# Patient Record
Sex: MALE | Race: AMERICAN INDIAN OR ALASKA NATIVE | Employment: FULL TIME | URBAN - METROPOLITAN AREA
[De-identification: names, ages, dates, MRNs, and addresses within clinical notes are randomized per-mention and may not be internally consistent; named-entity substitution may affect disease eponyms.]

---

## 2017-06-29 ENCOUNTER — OFFICE VISIT (OUTPATIENT)
Dept: FAMILY MEDICINE CLINIC | Age: 42
End: 2017-06-29

## 2017-06-29 VITALS
WEIGHT: 169 LBS | HEART RATE: 90 BPM | OXYGEN SATURATION: 98 % | TEMPERATURE: 98.1 F | BODY MASS INDEX: 25.61 KG/M2 | HEIGHT: 68 IN | RESPIRATION RATE: 17 BRPM | SYSTOLIC BLOOD PRESSURE: 128 MMHG | DIASTOLIC BLOOD PRESSURE: 82 MMHG

## 2017-06-29 DIAGNOSIS — Z00.00 GENERAL MEDICAL EXAM: Primary | ICD-10-CM

## 2017-06-29 NOTE — PATIENT INSTRUCTIONS

## 2017-06-29 NOTE — MR AVS SNAPSHOT
Visit Information Date & Time Provider Department Dept. Phone Encounter #  
 6/29/2017 10:15 AM [phone] [id number] Follow-up Instructions Return in about 1 year (around 6/29/2018). Upcoming Health Maintenance Date Due DTaP/Tdap/Td series (1 - Tdap) 9/13/1994 INFLUENZA AGE 9 TO ADULT 8/1/2017 Allergies as of 6/29/2017  Review Complete On: 6/29/2017 By: Carly Duong MD  
 No Known Allergies Current Immunizations  Never Reviewed Name Date Tdap 6/29/2016 Not reviewed this visit You Were Diagnosed With   
  
 Codes Comments General medical exam    -  Primary ICD-10-CM: Z00.00 ICD-9-CM: V70.9 Vitals BP Pulse Temp Resp Height(growth percentile) Weight(growth percentile) 128/82 (BP 1 Location: Left arm, BP Patient Position: Sitting) 90 98.1 °F (36.7 °C) (Oral) 17 5' 8\" (1.727 m) 169 lb (76.7 kg) SpO2 BMI Smoking Status 98% 25.7 kg/m2 Former Smoker BMI and BSA Data Body Mass Index Body Surface Area 25.7 kg/m 2 1.92 m 2 Preferred Pharmacy Pharmacy Name Phone [facility] [address] [phone] Your Updated Medication List  
  
Notice  As of 6/29/2017 11:47 AM  
 You have not been prescribed any medications. We Performed the Following CBC WITH AUTOMATED DIFF [45311 CPT(R)] LIPID PANEL [15867 CPT(R)] METABOLIC PANEL, COMPREHENSIVE [84270 CPT(R)] Follow-up Instructions Return in about 1 year (around 6/29/2018). Patient Instructions Well Visit, Ages 25 to 48: Care Instructions Your Care Instructions Physical exams can help you stay healthy. Your doctor has checked your overall health and may have suggested ways to take good care of yourself. He or she also may have recommended tests.  At home, you can help prevent illness with healthy eating, regular exercise, and other steps. Follow-up care is a key part of your treatment and safety. Be sure to make and go to all appointments, and call your doctor if you are having problems. It's also a good idea to know your test results and keep a list of the medicines you take. How can you care for yourself at home? · Reach and stay at a healthy weight. This will lower your risk for many problems, such as obesity, diabetes, heart disease, and high blood pressure. · Get at least 30 minutes of physical activity on most days of the week. Walking is a good choice. You also may want to do other activities, such as running, swimming, cycling, or playing tennis or team sports. Discuss any changes in your exercise program with your doctor. · Do not smoke or allow others to smoke around you. If you need help quitting, talk to your doctor about stop-smoking programs and medicines. These can increase your chances of quitting for good. · Talk to your doctor about whether you have any risk factors for sexually transmitted infections (STIs). Having one sex partner (who does not have STIs and does not have sex with anyone else) is a good way to avoid these infections. · Use birth control if you do not want to have children at this time. Talk with your doctor about the choices available and what might be best for you. · Protect your skin from too much sun. When you're outdoors from 10 a.m. to 4 p.m., stay in the shade or cover up with clothing and a hat with a wide brim. Wear sunglasses that block UV rays. Even when it's cloudy, put broad-spectrum sunscreen (SPF 30 or higher) on any exposed skin. · See a dentist one or two times a year for checkups and to have your teeth cleaned. · Wear a seat belt in the car. · Drink alcohol in moderation, if at all. That means no more than 2 drinks a day for men and 1 drink a day for women. Follow your doctor's advice about when to have certain tests. These tests can spot problems early. For everyone · Cholesterol. Have the fat (cholesterol) in your blood tested after age 21. Your doctor will tell you how often to have this done based on your age, family history, or other things that can increase your risk for heart disease. · Blood pressure. Have your blood pressure checked during a routine doctor visit. Your doctor will tell you how often to check your blood pressure based on your age, your blood pressure results, and other factors. · Vision. Talk with your doctor about how often to have a glaucoma test. 
· Diabetes. Ask your doctor whether you should have tests for diabetes. · Colon cancer. Have a test for colon cancer at age 48. You may have one of several tests. If you are younger than 48, you may need a test earlier if you have any risk factors. Risk factors include whether you already had a precancerous polyp removed from your colon or whether your parent, brother, sister, or child has had colon cancer. For women · Breast exam and mammogram. Talk to your doctor about when you should have a clinical breast exam and a mammogram. Medical experts differ on whether and how often women under 50 should have these tests. Your doctor can help you decide what is right for you. · Pap test and pelvic exam. Begin Pap tests at age 24. A Pap test is the best way to find cervical cancer. The test often is part of a pelvic exam. Ask how often to have this test. 
· Tests for sexually transmitted infections (STIs). Ask whether you should have tests for STIs. You may be at risk if you have sex with more than one person, especially if your partners do not wear condoms. For men · Tests for sexually transmitted infections (STIs). Ask whether you should have tests for STIs. You may be at risk if you have sex with more than one person, especially if you do not wear a condom. · Testicular cancer exam. Ask your doctor whether you should check your testicles regularly. · Prostate exam. Talk to your doctor about whether you should have a blood test (called a PSA test) for prostate cancer. Experts differ on whether and when men should have this test. Some experts suggest it if you are older than 39 and are -American or have a father or brother who got prostate cancer when he was younger than 72. When should you call for help? Watch closely for changes in your health, and be sure to contact your doctor if you have any problems or symptoms that concern you. Where can you learn more? Go to http://jc-fuad.info/. Enter P072 in the search box to learn more about \"Well Visit, Ages 25 to 48: Care Instructions. \" Current as of: July 19, 2016 Content Version: 11.3 © 8323-0931 [employer]. Care instructions adapted under license by [employer] (which disclaims liability or warranty for this information). If you have questions about a medical condition or this instruction, always ask your healthcare professional. [place] [address] any warranty or liability for your use of this information. Introducing [facility]! Herbert Bliss introduces [email or web address] patient portal. Now you can access parts of your medical record, email your doctor's office, and request medication refills online. 1. In your internet browser, go to [email or web address] 2. Click on the First Time User? Click Here link in the Sign In box. You will see the New Member Sign Up page. 3. Enter your [employer] Access Code exactly as it appears below. You will not need to use this code after youve completed the sign-up process. If you do not sign up before the expiration date, you must request a new code. · [email or web address] Access Code: [email or web address] Expires: 9/27/2017 10:31 AM 
 
 4. Enter the last four digits of your Social Security Number (xxxx) and Date of Birth (mm/dd/yyyy) as indicated and click Submit. You will be taken to the next sign-up page. 5. Create a [employer] ID. This will be your [employer] login ID and cannot be changed, so think of one that is secure and easy to remember. 6. Create a [employer] password. You can change your password at any time. 7. Enter your Password Reset Question and Answer. This can be used at a later time if you forget your password. 8. Enter your e-mail address. You will receive e-mail notification when new information is available in [address] 9. Click Sign Up. You can now view and download portions of your medical record. 10. Click the Download Summary menu link to download a portable copy of your medical information. If you have questions, please visit the Frequently Asked Questions section of the [employer] website. Remember, [employer] is NOT to be used for urgent needs. For medical emergencies, dial 911. Now available from your iPhone and Android! Please provide this summary of care documentation to your next provider. Your primary care clinician is listed as Terrell Barajas. If you have any questions after today's visit, please call [phone].

## 2017-06-29 NOTE — PROGRESS NOTES
Chief Complaint   Patient presents with   [facility]     Health Maintenance reviewed

## 2017-06-29 NOTE — PROGRESS NOTES
35 y.o. male presents for a physical.     History reviewed. No pertinent past medical history. Past Surgical History:   Procedure Laterality Date    HX ORTHOPAEDIC      scoliosis surgery       Family History   Problem Relation Age of Onset    Diabetes Mother     Heart Disease Mother     Diabetes Father        Social History   Substance Use Topics    Smoking status: Former Smoker     Quit date: 5/2/1996    Smokeless tobacco: Current User     Types: Snuff    Alcohol use 4.2 oz/week     7 Standard drinks or equivalent per week       Social History     Social History Narrative    . .        Health Maintenance Due   Topic Date Due    DTaP/Tdap/Td series (1 - Tdap) 09/13/1994         No Known Allergies    Review of Systems:  Gen: no fatigue, fever, chills, weight loss or weight gain  Eyes: no excessive tearing, itching, or discharge  Nose: no rhinorrhea, no sinus pain  Mouth: no oral lesions, no sore throat  Resp: no shortness of breath, no wheezing, no cough  CV: no chest pain, no orthopnea, no paroxysmal nocturnal dyspnea, no lower extremity edema, no palpitations  Abd: no nausea, no heartburn, no diarrhea, no constipation, no abdominal pain  Neuro: no headaches, no syncope or presyncopal episodes  Endo: no polyuria, no polydipsia  Heme: no lymphadenopathy, no easy bruising or bleeding    Visit Vitals    /82 (BP 1 Location: Left arm, BP Patient Position: Sitting)    Pulse 90    Temp 98.1 °F (36.7 °C) (Oral)    Resp 17    Ht 5' 8\" (1.727 m)    Wt 169 lb (76.7 kg)    SpO2 98%    BMI 25.7 kg/m2     Gen: alert, oriented, no acute distress  Ears: external auditory canals clear, TMs without erythema or effusion  Eyes: pupils equal round reactive to light, sclera clear, conjunctiva clear  Oral: moist mucus membranes, no oral lesions, no pharyngeal inflammation or exudate  Neck: thyroid symmetric and not enlarged, no carotid bruits, no jugular vein distention  Resp: no increase work of breathing, lungs clear to ausculation bilaterally, no wheezing, rales or rhonchi  CV: S1, S2 normal. No murmurs, rubs, or gallops. Abd: soft, not tender, not distended. No hepatosplenomegaly. Normal bowel sounds. No hernias. Neuro: cranial nerves intact, normal strength and movement in all extremities, reflexes and sensation intact and symmetric. Skin: no lesion or rash  Extremities: no cyanosis or edema    Assessment/Plan:    1. General medical exam  Age appropriate Health Maintenance activities reviewed with patient and updated. - CBC WITH AUTOMATED DIFF  - METABOLIC PANEL, COMPREHENSIVE  - LIPID PANEL      Health Maintenance reviewed - updated    Orders Placed This Encounter    CBC WITH AUTOMATED DIFF    METABOLIC PANEL, COMPREHENSIVE    LIPID PANEL       Medications Discontinued During This Encounter   Medication Reason    methylPREDNISolone (MEDROL DOSEPACK) 4 mg tablet Therapy Completed    phenylephrine-chlorpheniramine-hydrocodone (HISTINEX HC) 2-5-2.5 mg/5 mL Syrp Therapy Completed    phenylephrine-chlorpheniramine-hydrocodone (HISTINEX HC) 2-5-2.5 mg/5 mL Syrp Therapy Completed    predniSONE (STERAPRED DS) 10 mg dose pack Therapy Completed       Recommended healthy diet low in carbohydrates, fats, sodium and cholesterol. Recommended regular cardiovascular exercise 3-6 times per week for 30-60 minutes daily. Verbal and written instructions (see AVS) provided. Patient expresses understanding of diagnosis and treatment plan.

## 2017-06-30 LAB
ALBUMIN SERPL-MCNC: 4.6 G/DL (ref 3.5–5.5)
ALBUMIN/GLOB SERPL: 1.9 {RATIO} (ref 1.2–2.2)
ALP SERPL-CCNC: 76 IU/L (ref 39–117)
ALT SERPL-CCNC: 21 IU/L (ref 0–44)
AST SERPL-CCNC: 26 IU/L (ref 0–40)
BASOPHILS # BLD AUTO: 0.1 X10E3/UL (ref 0–0.2)
BASOPHILS NFR BLD AUTO: 1 %
BILIRUB SERPL-MCNC: 0.3 MG/DL (ref 0–1.2)
BUN SERPL-MCNC: 14 MG/DL (ref 6–24)
BUN/CREAT SERPL: 15 (ref 9–20)
CALCIUM SERPL-MCNC: 10.5 MG/DL (ref 8.7–10.2)
CHLORIDE SERPL-SCNC: 98 MMOL/L (ref 96–106)
CHOLEST SERPL-MCNC: 204 MG/DL (ref 100–199)
CO2 SERPL-SCNC: 27 MMOL/L (ref 18–29)
CREAT SERPL-MCNC: 0.94 MG/DL (ref 0.76–1.27)
EOSINOPHIL # BLD AUTO: 0.1 X10E3/UL (ref 0–0.4)
EOSINOPHIL NFR BLD AUTO: 3 %
ERYTHROCYTE [DISTWIDTH] IN BLOOD BY AUTOMATED COUNT: 13.9 % (ref 12.3–15.4)
GLOBULIN SER CALC-MCNC: 2.4 G/DL (ref 1.5–4.5)
GLUCOSE SERPL-MCNC: 95 MG/DL (ref 65–99)
HCT VFR BLD AUTO: 45.4 % (ref 37.5–51)
HDLC SERPL-MCNC: 83 MG/DL
HGB BLD-MCNC: 15 G/DL (ref 12.6–17.7)
IMM GRANULOCYTES # BLD: 0 X10E3/UL (ref 0–0.1)
IMM GRANULOCYTES NFR BLD: 0 %
INTERPRETATION, 910389: NORMAL
LDLC SERPL CALC-MCNC: 107 MG/DL (ref 0–99)
LYMPHOCYTES # BLD AUTO: 1.6 X10E3/UL (ref 0.7–3.1)
LYMPHOCYTES NFR BLD AUTO: 35 %
MCH RBC QN AUTO: 28.7 PG (ref 26.6–33)
MCHC RBC AUTO-ENTMCNC: 33 G/DL (ref 31.5–35.7)
MCV RBC AUTO: 87 FL (ref 79–97)
MONOCYTES # BLD AUTO: 0.5 X10E3/UL (ref 0.1–0.9)
MONOCYTES NFR BLD AUTO: 10 %
NEUTROPHILS # BLD AUTO: 2.5 X10E3/UL (ref 1.4–7)
NEUTROPHILS NFR BLD AUTO: 51 %
PLATELET # BLD AUTO: 231 X10E3/UL (ref 150–379)
POTASSIUM SERPL-SCNC: 4 MMOL/L (ref 3.5–5.2)
PROT SERPL-MCNC: 7 G/DL (ref 6–8.5)
RBC # BLD AUTO: 5.22 X10E6/UL (ref 4.14–5.8)
SODIUM SERPL-SCNC: 141 MMOL/L (ref 134–144)
TRIGL SERPL-MCNC: 71 MG/DL (ref 0–149)
VLDLC SERPL CALC-MCNC: 14 MG/DL (ref 5–40)
WBC # BLD AUTO: 4.8 X10E3/UL (ref 3.4–10.8)

## 2017-07-05 ENCOUNTER — OFFICE VISIT (OUTPATIENT)
Dept: FAMILY MEDICINE CLINIC | Age: 42
End: 2017-07-05

## 2017-07-05 VITALS
WEIGHT: 169 LBS | RESPIRATION RATE: 14 BRPM | SYSTOLIC BLOOD PRESSURE: 123 MMHG | DIASTOLIC BLOOD PRESSURE: 90 MMHG | HEIGHT: 68 IN | HEART RATE: 86 BPM | BODY MASS INDEX: 25.61 KG/M2 | TEMPERATURE: 98 F | OXYGEN SATURATION: 96 %

## 2017-07-05 DIAGNOSIS — L23.7 POISON IVY DERMATITIS: ICD-10-CM

## 2017-07-05 RX ORDER — TRIAMCINOLONE ACETONIDE 1 MG/G
CREAM TOPICAL 2 TIMES DAILY
Qty: 80 G | Refills: 0 | Status: SHIPPED | OUTPATIENT
Start: 2017-07-05 | End: 2019-04-15

## 2017-07-05 RX ORDER — PREDNISONE 10 MG/1
TABLET ORAL
Qty: 21 TAB | Refills: 0 | Status: SHIPPED | OUTPATIENT
Start: 2017-07-05 | End: 2019-01-31 | Stop reason: ALTCHOICE

## 2017-07-05 RX ORDER — TRIAMCINOLONE ACETONIDE 1 MG/G
CREAM TOPICAL 2 TIMES DAILY
Qty: 80 G | Refills: 0 | Status: SHIPPED | OUTPATIENT
Start: 2017-07-05 | End: 2017-07-05 | Stop reason: SDUPTHER

## 2017-07-05 RX ORDER — TRIAMCINOLONE ACETONIDE 40 MG/ML
20 INJECTION, SUSPENSION INTRA-ARTICULAR; INTRAMUSCULAR ONCE
Qty: 0.5 ML | Refills: 0
Start: 2017-07-05 | End: 2017-07-05

## 2017-07-05 RX ORDER — PREDNISONE 10 MG/1
TABLET ORAL
Qty: 21 TAB | Refills: 0 | Status: SHIPPED | OUTPATIENT
Start: 2017-07-05 | End: 2017-07-05 | Stop reason: SDUPTHER

## 2017-07-05 NOTE — PROGRESS NOTES
After obtaining consent, and per orders of Dr. Luisa Cavazos, injection of Kenalog 40 mg given by Lisbet Osborne. Patient instructed to remain in clinic for 20 minutes afterwards, and to report any adverse reaction to me immediately.

## 2017-07-05 NOTE — PROGRESS NOTES
HPI  Jorden Mobley. is a 35 y.o. male who presents with a rash, was picking by. She had a day and a day later developed his typical poison ivy rash. The next day similar rash sprung up on his right cheek and left buttock. He has a history of bad allergic reactions to poison ivy. He is received Kenalog injection and steroid Dosepak in the past which he finds very effective. He is requesting the same again. PMHx:  No past medical history on file. Meds:   Current Outpatient Prescriptions   Medication Sig Dispense Refill    predniSONE (STERAPRED DS) 10 mg dose pack See administration instruction per 10mg dose pack 21 Tab 0    triamcinolone acetonide (KENALOG) 0.1 % topical cream Apply  to affected area two (2) times a day. use thin layer 80 g 0       Allergies:   No Known Allergies    Smoker:  History   Smoking Status    Former Smoker    Quit date: 5/2/1996   Smokeless Tobacco    Current User    Types: Snuff       ETOH:   History   Alcohol Use    4.2 oz/week    7 Standard drinks or equivalent per week       FH:   Family History   Problem Relation Age of Onset    Diabetes Mother     Heart Disease Mother     Diabetes Father        ROS:   As listed in HPI. In addition:  Constitutional:   No headache, fever, fatigue, weight loss or weight gain      Cardiac:    No chest pain      Resp:   No cough or shortness of breath      Neuro   No loss of consciousness, dizziness, seizures      Physical Exam:  Blood pressure 123/90, pulse 86, temperature 98 °F (36.7 °C), resp. rate 14, height 5' 8\" (1.727 m), weight 169 lb (76.7 kg), SpO2 96 %. GEN: No apparent distress. Alert and oriented and responds to all questions appropriately. NEUROLOGIC:  No focal neurologic deficits. Strength and sensation grossly intact. Coordination and gait grossly intact. EXT: Well perfused. No edema. SKIN: Contact dermatitis of the left wrist about 2 inches square, right cheek in a few spots.        Assessment and Plan Poison ivy dermatitis  Relatively minor, would be amenable to steroid cream.  Patient is concerned that it might get worse and it certainly may given the timeline. The rash on his face in particular is still evolving. Will do as he requests, Kenalog Injection, prednisone Dosepak (which she has had in the past) just in case, strongly recommend that he also use the topical steroids as rash may return after the systemic steroids wear off. ICD-10-CM ICD-9-CM    1. Poison ivy dermatitis L23.7 692.6 triamcinolone acetonide (KENALOG) 40 mg/mL injection      predniSONE (STERAPRED DS) 10 mg dose pack      triamcinolone acetonide (KENALOG) 0.1 % topical cream      TRIAMCINOLONE ACETONIDE INJ      DISCONTINUED: predniSONE (STERAPRED DS) 10 mg dose pack      DISCONTINUED: triamcinolone acetonide (KENALOG) 0.1 % topical cream       AVS given.  Pt expressed understanding of instructions

## 2019-01-31 ENCOUNTER — OFFICE VISIT (OUTPATIENT)
Dept: FAMILY MEDICINE CLINIC | Age: 44
End: 2019-01-31

## 2019-01-31 VITALS
OXYGEN SATURATION: 98 % | HEART RATE: 107 BPM | SYSTOLIC BLOOD PRESSURE: 141 MMHG | WEIGHT: 178 LBS | RESPIRATION RATE: 12 BRPM | DIASTOLIC BLOOD PRESSURE: 100 MMHG | BODY MASS INDEX: 26.98 KG/M2 | HEIGHT: 68 IN | TEMPERATURE: 98.3 F

## 2019-01-31 DIAGNOSIS — R03.0 ELEVATED BP WITHOUT DIAGNOSIS OF HYPERTENSION: ICD-10-CM

## 2019-01-31 DIAGNOSIS — Z00.00 GENERAL MEDICAL EXAM: ICD-10-CM

## 2019-01-31 DIAGNOSIS — K52.9 GASTROENTERITIS: Primary | ICD-10-CM

## 2019-01-31 DIAGNOSIS — F43.0 ACUTE STRESS REACTION: ICD-10-CM

## 2019-01-31 DIAGNOSIS — R11.2 NON-INTRACTABLE VOMITING WITH NAUSEA, UNSPECIFIED VOMITING TYPE: ICD-10-CM

## 2019-01-31 RX ORDER — ONDANSETRON 4 MG/1
4 TABLET, ORALLY DISINTEGRATING ORAL
Qty: 40 TAB | Refills: 0 | Status: SHIPPED | OUTPATIENT
Start: 2019-01-31 | End: 2019-06-12

## 2019-01-31 NOTE — PROGRESS NOTES
HPI  Fani Vick. is a 37y.o. year old male patient of Sivakumar Barger MD who presents with c/o stomach pain. Pt has history of does not have a problem list on file. .    Pt c/o vomiting since last Friday. Denies abd pain. Denies diarrhea or loose stools. Is tolerating PO. Had 2 episodes of vomiting yesterday. Has some mild heartburn, chronic not new, takes tums prn. Wife had stomach flu last week. Wife is a nurse. Has taken some of her nausea medication, not sure of name. Drinks 4-5 beers/day but not everyday of the week (not when he is working), averages around 15 drinks/week. Pt hasn't been seen in clinic since 2017. Declines flu vaccine. Doesn't check BP at home. Denies hx of tachycardia. Denies chest pain or SOB. Reports feeling down since witnessing the death of a child at work last week. Pt is a , was first one on scene to death of a young child. Denies hx of mental illness. Denies anhedonia. Denies SI/HI. Feels depressed some days of the week. Has a cousin that's a  that he can talk to. Has good support with his family. He is not interested in talking to a counselor at this time. 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 There is no problem list on file for this patient. No past medical history on file. Past Surgical History:   Procedure Laterality Date    HX ORTHOPAEDIC      scoliosis surgery     Social History     Socioeconomic History    Marital status:      Spouse name: Not on file    Number of children: Not on file    Years of education: Not on file    Highest education level: Not on file   Tobacco Use    Smoking status: Former Smoker     Last attempt to quit: 1996     Years since quittin.7    Smokeless tobacco: Current User     Types: Snuff   Substance and Sexual Activity    Alcohol use:  Yes     Alcohol/week: 4.2 oz     Types: 7 Standard drinks or equivalent per week    Drug use: No    Sexual activity: Yes Partners: Female   Social History Narrative    . . Family History   Problem Relation Age of Onset    Diabetes Mother     Heart Disease Mother     Hypertension Mother     Heart Attack Mother     Diabetes Father     Hypertension Father     Pancreatic Cancer Paternal Grandmother      No Known Allergies    MEDICATIONS  Current Outpatient Medications   Medication Sig    ondansetron (ZOFRAN ODT) 4 mg disintegrating tablet Take 1 Tab by mouth every eight (8) hours as needed for Nausea.  triamcinolone acetonide (KENALOG) 0.1 % topical cream Apply  to affected area two (2) times a day. use thin layer     No current facility-administered medications for this visit. REVIEW OF SYSTEMS  Per HPI        Visit Vitals  BP (!) 141/100 (BP 1 Location: Left arm, BP Patient Position: Sitting)   Pulse (!) 107   Temp 98.3 °F (36.8 °C) (Oral)   Resp 12   Ht 5' 8\" (1.727 m)   Wt 178 lb (80.7 kg)   SpO2 98%   BMI 27.06 kg/m²         General: Well-developed, well-nourished. In no distress. A&O x 3. Head: Normocephalic, atraumatic. Eyes: Conjunctiva clear. Lungs: Clear to auscultation bilaterally. No crackles or wheezes. No use of accessory muscles. Speaks in full sentences without SOB. Chest Wall: No tenderness or deformity. Heart: RRR, normal S1 and S2, no murmur, click, rub, or gallop. Back: Symmetric. Abdomen: Soft, non-distended, bowel sounds normal. No tenderness. No masses. No hepatosplenomegaly. .   Skin: No rashes or lesions. Musculoskeletal: Gait normal.  Psychiatric: Normal mood and affect. Behavior is normal.       ASSESSMENT and PLAN  Diagnoses and all orders for this visit:    1. Gastroenteritis  -     ondansetron (ZOFRAN ODT) 4 mg disintegrating tablet; Take 1 Tab by mouth every eight (8) hours as needed for Nausea. -contact office if sx worsening or not improving    2.  Non-intractable vomiting with nausea, unspecified vomiting type  -     LIPASE- r/o pancreatitis, low suspicion as pt not in abd pain     3. General medical exam  -     CBC WITH AUTOMATED DIFF  -     METABOLIC PANEL, COMPREHENSIVE  -     LIPID PANEL    4. Acute stress reaction  Discussed options to treat possible stress reaction vs PTSD including referral to counseling, starting an SSRI if sx worsen or occur daily, etc.   Discuss at f/u in 1 month     5. Elevated BP without diagnosis of hypertension  First elevated BP reading, may be due to acute illness  Will recheck in 1 month           Patient Instructions          Gastroenteritis: Care Instructions  Your Care Instructions    Gastroenteritis is an illness that may cause nausea, vomiting, and diarrhea. It is sometimes called \"stomach flu. \" It can be caused by bacteria or a virus. You will probably begin to feel better in 1 to 2 days. In the meantime, get plenty of rest and make sure you do not become dehydrated. Dehydration occurs when your body loses too much fluid. Follow-up care is a key part of your treatment and safety. Be sure to make and go to all appointments, and call your doctor if you are having problems. It's also a good idea to know your test results and keep a list of the medicines you take. How can you care for yourself at home? · If your doctor prescribed antibiotics, take them as directed. Do not stop taking them just because you feel better. You need to take the full course of antibiotics. · Drink plenty of fluids to prevent dehydration, enough so that your urine is light yellow or clear like water. Choose water and other caffeine-free clear liquids until you feel better. If you have kidney, heart, or liver disease and have to limit fluids, talk with your doctor before you increase your fluid intake. · Drink fluids slowly, in frequent, small amounts, because drinking too much too fast can cause vomiting. · Begin eating mild foods, such as dry toast, yogurt, applesauce, bananas, and rice.  Avoid spicy, hot, or high-fat foods, and do not drink alcohol or caffeine for a day or two. Do not drink milk or eat ice cream until you are feeling better. How to prevent gastroenteritis  · Keep hot foods hot and cold foods cold. · Do not eat meats, dressings, salads, or other foods that have been kept at room temperature for more than 2 hours. · Use a thermometer to check your refrigerator. It should be between 34°F and 40°F.  · Defrost meats in the refrigerator or microwave, not on the kitchen counter. · Keep your hands and your kitchen clean. Wash your hands, cutting boards, and countertops with hot soapy water frequently. · Cook meat until it is well done. · Do not eat raw eggs or uncooked sauces made with raw eggs. · Do not take chances. If food looks or tastes spoiled, throw it out. When should you call for help? Call 911 anytime you think you may need emergency care. For example, call if:    · You vomit blood or what looks like coffee grounds.     · You passed out (lost consciousness).     · You pass maroon or very bloody stools.    Call your doctor now or seek immediate medical care if:    · You have severe belly pain.     · You have signs of needing more fluids. You have sunken eyes, a dry mouth, and pass only a little dark urine.     · You feel like you are going to faint.     · You have increased belly pain that does not go away in 1 to 2 days.     · You have new or increased nausea, or you are vomiting.     · You have a new or higher fever.     · Your stools are black and tarlike or have streaks of blood.    Watch closely for changes in your health, and be sure to contact your doctor if:    · You are dizzy or lightheaded.     · You urinate less than usual, or your urine is dark yellow or brown.     · You do not feel better with each day that goes by. Where can you learn more? Go to http://jc-fuad.info/. Enter N142 in the search box to learn more about \"Gastroenteritis: Care Instructions. \"  Current as of: July 30, 2018  Content Version: 11.9  © 0716-3091 [employer]. Care instructions adapted under license by [employer] (which disclaims liability or warranty for this information). If you have questions about a medical condition or this instruction, always ask your healthcare professional. [address] any warranty or liability for your use of this information. Learning About Stress  What is stress? Stress is what you feel when you have to handle more than you are used to. Stress is a fact of life for most people, and it affects everyone differently. What causes stress for you may not be stressful for someone else. A lot of things can cause stress. You may feel stress when you go on a job interview, take a test, or run a race. This kind of short-term stress is normal and even useful. It can help you if you need to work hard or react quickly. For example, stress can help you finish an important job on time. Stress also can last a long time. Long-term stress is caused by stressful situations or events. Examples of long-term stress include long-term health problems, ongoing problems at work, or conflicts in your family. Long-term stress can harm your health. How does stress affect your health? When you are stressed, your body responds as though you are in danger. It makes hormones that speed up your heart, make you breathe faster, and give you a burst of energy. This is called the fight-or-flight stress response. If the stress is over quickly, your body goes back to normal and no harm is done. But if stress happens too often or lasts too long, it can have bad effects. Long-term stress can make you more likely to get sick, and it can make symptoms of some diseases worse. If you tense up when you are stressed, you may develop neck, shoulder, or low back pain. Stress is linked to high blood pressure and heart disease. Stress also harms your emotional health.  It can make you johnson, tense, or depressed. Your relationships may suffer, and you may not do well at work or school. What can you do to manage stress? How to relax your mind  · Write. It may help to write about things that are bothering you. This helps you find out how much stress you feel and what is causing it. When you know this, you can find better ways to cope. · Let your feelings out. Talk, laugh, cry, and express anger when you need to. Talking with friends, family, a counselor, or a member of the clergy about your feelings is a healthy way to relieve stress. · Do something you enjoy. For example, listen to music or go to a movie. Practice your hobby or do volunteer work. · Meditate. This can help you relax, because you are not worrying about what happened before or what may happen in the future. · Do guided imagery. Imagine yourself in any setting that helps you feel calm. You can use audiotapes, books, or a teacher to guide you. How to relax your body  · Do something active. Exercise or activity can help reduce stress. Walking is a great way to get started. Even everyday activities such as housecleaning or yard work can help. · Do breathing exercises. For example:  ? From a standing position, bend forward from the waist with your knees slightly bent. Let your arms dangle close to the floor. ? Breathe in slowly and deeply as you return to a standing position. Roll up slowly and lift your head last.  ? Hold your breath for just a few seconds in the standing position. ? Breathe out slowly and bend forward from the waist.  · Try yoga or devora chi. These techniques combine exercise and meditation. You may need some training at first to learn them. What can you do to prevent stress? · Manage your time. This helps you find time to do the things you want and need to do. · Get enough sleep. Your body recovers from the stresses of the day while you are sleeping. · Get support.  Your family, friends, and community can make a difference in how you experience stress. Where can you learn more? Go to http://jc-fuad.info/. Enter R190 in the search box to learn more about \"Learning About Stress. \"  Current as of: June 28, 2018  Content Version: 11.9  © 0820-9128 [employer]. Care instructions adapted under license by [employer] (which disclaims liability or warranty for this information). If you have questions about a medical condition or this instruction, always ask your healthcare professional. [place] [address] any warranty or liability for your use of this information. Please keep your follow-up appointment with Yadi Bentley MD.     Follow-up Disposition:  Return in about 1 month (around 2/28/2019), or if symptoms worsen or fail to improve, for w/Dr. Jason Valdivia for routine physical, BP check. Pt declines flu vaccine. I have discussed the diagnosis with the patient and the intended plan as seen in the above orders. Patient is in agreement. The patient has received an after-visit summary and questions were answered concerning future plans. I have discussed medication side effects and warnings with the patient as well. Warning signs for the above conditions were discussed including when to call our office or go to the emergency room. The nurse provided the patient and/or family with advanced directive information if needed and encouraged the patient to provide a copy to the office when available.

## 2019-01-31 NOTE — PATIENT INSTRUCTIONS
Gastroenteritis: Care Instructions  Your Care Instructions    Gastroenteritis is an illness that may cause nausea, vomiting, and diarrhea. It is sometimes called \"stomach flu. \" It can be caused by bacteria or a virus. You will probably begin to feel better in 1 to 2 days. In the meantime, get plenty of rest and make sure you do not become dehydrated. Dehydration occurs when your body loses too much fluid. Follow-up care is a key part of your treatment and safety. Be sure to make and go to all appointments, and call your doctor if you are having problems. It's also a good idea to know your test results and keep a list of the medicines you take. How can you care for yourself at home? · If your doctor prescribed antibiotics, take them as directed. Do not stop taking them just because you feel better. You need to take the full course of antibiotics. · Drink plenty of fluids to prevent dehydration, enough so that your urine is light yellow or clear like water. Choose water and other caffeine-free clear liquids until you feel better. If you have kidney, heart, or liver disease and have to limit fluids, talk with your doctor before you increase your fluid intake. · Drink fluids slowly, in frequent, small amounts, because drinking too much too fast can cause vomiting. · Begin eating mild foods, such as dry toast, yogurt, applesauce, bananas, and rice. Avoid spicy, hot, or high-fat foods, and do not drink alcohol or caffeine for a day or two. Do not drink milk or eat ice cream until you are feeling better. How to prevent gastroenteritis  · Keep hot foods hot and cold foods cold. · Do not eat meats, dressings, salads, or other foods that have been kept at room temperature for more than 2 hours. · Use a thermometer to check your refrigerator. It should be between 34°F and 40°F.  · Defrost meats in the refrigerator or microwave, not on the kitchen counter. · Keep your hands and your kitchen clean.  Wash your hands, cutting boards, and countertops with hot soapy water frequently. · Cook meat until it is well done. · Do not eat raw eggs or uncooked sauces made with raw eggs. · Do not take chances. If food looks or tastes spoiled, throw it out. When should you call for help? Call 911 anytime you think you may need emergency care. For example, call if:    · You vomit blood or what looks like coffee grounds.     · You passed out (lost consciousness).     · You pass maroon or very bloody stools.    Call your doctor now or seek immediate medical care if:    · You have severe belly pain.     · You have signs of needing more fluids. You have sunken eyes, a dry mouth, and pass only a little dark urine.     · You feel like you are going to faint.     · You have increased belly pain that does not go away in 1 to 2 days.     · You have new or increased nausea, or you are vomiting.     · You have a new or higher fever.     · Your stools are black and tarlike or have streaks of blood.    Watch closely for changes in your health, and be sure to contact your doctor if:    · You are dizzy or lightheaded.     · You urinate less than usual, or your urine is dark yellow or brown.     · You do not feel better with each day that goes by. Where can you learn more? Go to http://jc-fuad.info/. Enter N142 in the search box to learn more about \"Gastroenteritis: Care Instructions. \"  Current as of: July 30, 2018  Content Version: 11.9  © 5730-1914 [employer]. Care instructions adapted under license by [employer] (which disclaims liability or warranty for this information). If you have questions about a medical condition or this instruction, always ask your healthcare professional. [place] [address] any warranty or liability for your use of this information. Learning About Stress  What is stress?     Stress is what you feel when you have to handle more than you are used to. Stress is a fact of life for most people, and it affects everyone differently. What causes stress for you may not be stressful for someone else. A lot of things can cause stress. You may feel stress when you go on a job interview, take a test, or run a race. This kind of short-term stress is normal and even useful. It can help you if you need to work hard or react quickly. For example, stress can help you finish an important job on time. Stress also can last a long time. Long-term stress is caused by stressful situations or events. Examples of long-term stress include long-term health problems, ongoing problems at work, or conflicts in your family. Long-term stress can harm your health. How does stress affect your health? When you are stressed, your body responds as though you are in danger. It makes hormones that speed up your heart, make you breathe faster, and give you a burst of energy. This is called the fight-or-flight stress response. If the stress is over quickly, your body goes back to normal and no harm is done. But if stress happens too often or lasts too long, it can have bad effects. Long-term stress can make you more likely to get sick, and it can make symptoms of some diseases worse. If you tense up when you are stressed, you may develop neck, shoulder, or low back pain. Stress is linked to high blood pressure and heart disease. Stress also harms your emotional health. It can make you johnson, tense, or depressed. Your relationships may suffer, and you may not do well at work or school. What can you do to manage stress? How to relax your mind  · Write. It may help to write about things that are bothering you. This helps you find out how much stress you feel and what is causing it. When you know this, you can find better ways to cope. · Let your feelings out. Talk, laugh, cry, and express anger when you need to.  Talking with friends, family, a counselor, or a member of the clergy about your feelings is a healthy way to relieve stress. · Do something you enjoy. For example, listen to music or go to a movie. Practice your hobby or do volunteer work. · Meditate. This can help you relax, because you are not worrying about what happened before or what may happen in the future. · Do guided imagery. Imagine yourself in any setting that helps you feel calm. You can use audiotapes, books, or a teacher to guide you. How to relax your body  · Do something active. Exercise or activity can help reduce stress. Walking is a great way to get started. Even everyday activities such as housecleaning or yard work can help. · Do breathing exercises. For example:  ? From a standing position, bend forward from the waist with your knees slightly bent. Let your arms dangle close to the floor. ? Breathe in slowly and deeply as you return to a standing position. Roll up slowly and lift your head last.  ? Hold your breath for just a few seconds in the standing position. ? Breathe out slowly and bend forward from the waist.  · Try yoga or devora chi. These techniques combine exercise and meditation. You may need some training at first to learn them. What can you do to prevent stress? · Manage your time. This helps you find time to do the things you want and need to do. · Get enough sleep. Your body recovers from the stresses of the day while you are sleeping. · Get support. Your family, friends, and community can make a difference in how you experience stress. Where can you learn more? Go to http://jc-fuad.info/. Enter S852 in the search box to learn more about \"Learning About Stress. \"  Current as of: June 28, 2018  Content Version: 11.9  © 0190-2932 [employer]. Care instructions adapted under license by [employer] (which disclaims liability or warranty for this information).  If you have questions about a medical condition or this instruction, always ask your healthcare professional. [address] any warranty or liability for your use of this information.

## 2019-01-31 NOTE — PROGRESS NOTES
Chief Complaint   Patient presents with    Vomiting         Patient is here to be evaluated for abdominal pain due to vomiting. Patient states his  wife had stomach flu last week. 1. Have you been to the ER, urgent care clinic since your last visit? Hospitalized since your last visit? No    2. Have you seen or consulted any other health care providers outside of the [address] [place] [address] since your last visit? Include any pap smears or colon screening.  No

## 2019-02-01 LAB
ALBUMIN SERPL-MCNC: 4.5 G/DL (ref 3.5–5.5)
ALBUMIN/GLOB SERPL: 1.8 {RATIO} (ref 1.2–2.2)
ALP SERPL-CCNC: 67 IU/L (ref 39–117)
ALT SERPL-CCNC: 49 IU/L (ref 0–44)
AST SERPL-CCNC: 56 IU/L (ref 0–40)
BASOPHILS # BLD AUTO: 0 X10E3/UL (ref 0–0.2)
BASOPHILS NFR BLD AUTO: 1 %
BILIRUB SERPL-MCNC: 0.4 MG/DL (ref 0–1.2)
BUN SERPL-MCNC: 13 MG/DL (ref 6–24)
BUN/CREAT SERPL: 13 (ref 9–20)
CALCIUM SERPL-MCNC: 9.5 MG/DL (ref 8.7–10.2)
CHLORIDE SERPL-SCNC: 96 MMOL/L (ref 96–106)
CHOLEST SERPL-MCNC: 218 MG/DL (ref 100–199)
CO2 SERPL-SCNC: 28 MMOL/L (ref 20–29)
CREAT SERPL-MCNC: 1.01 MG/DL (ref 0.76–1.27)
EOSINOPHIL # BLD AUTO: 0.1 X10E3/UL (ref 0–0.4)
EOSINOPHIL NFR BLD AUTO: 2 %
ERYTHROCYTE [DISTWIDTH] IN BLOOD BY AUTOMATED COUNT: 13.9 % (ref 12.3–15.4)
GLOBULIN SER CALC-MCNC: 2.5 G/DL (ref 1.5–4.5)
GLUCOSE SERPL-MCNC: 102 MG/DL (ref 65–99)
HCT VFR BLD AUTO: 45.4 % (ref 37.5–51)
HDLC SERPL-MCNC: 78 MG/DL
HGB BLD-MCNC: 15.3 G/DL (ref 13–17.7)
IMM GRANULOCYTES # BLD AUTO: 0 X10E3/UL (ref 0–0.1)
IMM GRANULOCYTES NFR BLD AUTO: 0 %
INTERPRETATION, 910389: NORMAL
LDLC SERPL CALC-MCNC: 94 MG/DL (ref 0–99)
LIPASE SERPL-CCNC: 23 U/L (ref 13–78)
LYMPHOCYTES # BLD AUTO: 1.5 X10E3/UL (ref 0.7–3.1)
LYMPHOCYTES NFR BLD AUTO: 29 %
MCH RBC QN AUTO: 29.1 PG (ref 26.6–33)
MCHC RBC AUTO-ENTMCNC: 33.7 G/DL (ref 31.5–35.7)
MCV RBC AUTO: 87 FL (ref 79–97)
MONOCYTES # BLD AUTO: 0.4 X10E3/UL (ref 0.1–0.9)
MONOCYTES NFR BLD AUTO: 8 %
NEUTROPHILS # BLD AUTO: 3 X10E3/UL (ref 1.4–7)
NEUTROPHILS NFR BLD AUTO: 60 %
PLATELET # BLD AUTO: 243 X10E3/UL (ref 150–379)
POTASSIUM SERPL-SCNC: 3.4 MMOL/L (ref 3.5–5.2)
PROT SERPL-MCNC: 7 G/DL (ref 6–8.5)
RBC # BLD AUTO: 5.25 X10E6/UL (ref 4.14–5.8)
SODIUM SERPL-SCNC: 143 MMOL/L (ref 134–144)
TRIGL SERPL-MCNC: 228 MG/DL (ref 0–149)
VLDLC SERPL CALC-MCNC: 46 MG/DL (ref 5–40)
WBC # BLD AUTO: 5.1 X10E3/UL (ref 3.4–10.8)

## 2019-02-01 NOTE — PROGRESS NOTES
Pls let pt know his blood counts, blood sugar, and kidney function are normal.   His liver function is slightly increased- pls try to reduce alcohol intake as this is the possible cause. His cholesterol and triglycerides are mildly elevated- try to reduce saturated fats and sugary drinks/foods in the diet.    Please f/u with Dr. Mary Agosto in 1 month for physical.

## 2019-02-04 NOTE — PROGRESS NOTES
Patient verified his name and . Patient notified of lab results and recommendations per Vernoica Hawthorne NP's result note. He verbalized understanding.

## 2019-04-15 ENCOUNTER — OFFICE VISIT (OUTPATIENT)
Dept: CARDIOLOGY CLINIC | Age: 44
End: 2019-04-15

## 2019-04-15 VITALS
HEART RATE: 97 BPM | SYSTOLIC BLOOD PRESSURE: 152 MMHG | WEIGHT: 169.6 LBS | OXYGEN SATURATION: 97 % | DIASTOLIC BLOOD PRESSURE: 100 MMHG | BODY MASS INDEX: 25.7 KG/M2 | HEIGHT: 68 IN | RESPIRATION RATE: 16 BRPM

## 2019-04-15 DIAGNOSIS — R07.9 CHEST PAIN, UNSPECIFIED TYPE: Primary | ICD-10-CM

## 2019-04-15 DIAGNOSIS — E78.00 HYPERCHOLESTEREMIA: ICD-10-CM

## 2019-04-15 DIAGNOSIS — I10 ESSENTIAL HYPERTENSION: ICD-10-CM

## 2019-04-15 RX ORDER — LISINOPRIL 5 MG/1
5 TABLET ORAL DAILY
Qty: 30 TAB | Refills: 1 | Status: SHIPPED | OUTPATIENT
Start: 2019-04-15 | End: 2019-06-12

## 2019-04-15 NOTE — PROGRESS NOTES
Kenneth Melchor, [facility]    Subjective/HPI:     Mr. Farida Lyles is a 37 y.o. male is here for new patient consultation. He has no significant cardiac history. He notes complaints of chest pain symptoms that have been ongoing for a year. The symptoms mainly occur when driving or doing passive activities. They do not last long, only a few seconds. There is no radiation of the pain. The pain is located anterior left chest.  There is no associated symptoms of shortness of breath, dizziness, nausea or diaphoresis. The symptoms have not worsened over the past year. He notes significant stressors. He works as a , but hopes to retire in the next few months. He had a colleague around his age die of a ruptured aneurysm right in front of him. He was a previous smoker, but quit in . He does chew tobacco regularly. He drinks alcohol almost daily, reports about 2-3 bottles of beer. He denies illicit drug abuse. He reports his mom had a heart attack when she was pregnant with him, but did not require any intervention at that time. She did not have any further cardiac issues throughout her life, and ultimately  from stroke in 2016. His father is a diabetic and has high blood pressure and cholesterol. He has one sister, but they are estranged, so he does not know her medical history. PCP Provider  Tk Velazquez MD  No past medical history on file.    Past Surgical History:   Procedure Laterality Date    HX ORTHOPAEDIC      scoliosis surgery     Family History   Problem Relation Age of Onset    Diabetes Mother     Heart Disease Mother     Hypertension Mother     Heart Attack Mother     Diabetes Father     Hypertension Father     Pancreatic Cancer Paternal Grandmother      Social History     Socioeconomic History    Marital status:      Spouse name: Not on file    Number of children: Not on file    Years of education: Not on file    Highest education level: Not on file   Occupational History    Not on file   Social Needs    Financial resource strain: Not on file    Food insecurity:     Worry: Not on file     Inability: Not on file    Transportation needs:     Medical: Not on file     Non-medical: Not on file   Tobacco Use    Smoking status: Former Smoker     Types: Cigarettes     Last attempt to quit: 1996     Years since quittin.9    Smokeless tobacco: Current User     Types: Snuff   Substance and Sexual Activity    Alcohol use: Yes     Alcohol/week: 2.4 oz     Types: 4 Cans of beer per week     Comment: 4-5 BEERS NIGHTLY    Drug use: No    Sexual activity: Yes     Partners: Female   Lifestyle    Physical activity:     Days per week: Not on file     Minutes per session: Not on file    Stress: Not on file   Relationships    Social connections:     Talks on phone: Not on file     Gets together: Not on file     Attends Cheondoism service: Not on file     Active member of club or organization: Not on file     Attends meetings of clubs or organizations: Not on file     Relationship status: Not on file    Intimate partner violence:     Fear of current or ex partner: Not on file     Emotionally abused: Not on file     Physically abused: Not on file     Forced sexual activity: Not on file   Other Topics Concern    Not on file   Social History Narrative    . . No Known Allergies     Current Outpatient Medications   Medication Sig    ondansetron (ZOFRAN ODT) 4 mg disintegrating tablet Take 1 Tab by mouth every eight (8) hours as needed for Nausea. No current facility-administered medications for this visit. I have reviewed the problem list, allergy list, medical history, family, social history and medications. Review of Symptoms:    Review of Systems   Constitutional: Negative for chills, fever and weight loss. HENT: Negative for nosebleeds. Eyes: Negative for blurred vision and double vision.    Respiratory: Negative for cough, shortness of breath and wheezing. Cardiovascular: Negative for chest pain, palpitations, orthopnea, leg swelling and PND. Gastrointestinal: Negative for abdominal pain, blood in stool, diarrhea, nausea and vomiting. Musculoskeletal: Negative for joint pain. Skin: Negative for rash. Neurological: Negative for dizziness, tingling and loss of consciousness. Endo/Heme/Allergies: Does not bruise/bleed easily. Physical Exam:      General: Well developed, in no acute distress, cooperative and alert  HEENT: No carotid bruits, no JVD, trach is midline. Neck Supple, PEERL, EOM intact. Heart:  reg rate and rhythm; normal S1/S2; no murmurs, gallops or rubs. Respiratory: Clear bilaterally x 4, no wheezing or rales  Abdomen:   Soft, non-tender, no distention, no masses. + BS. Extremities:  Normal cap refill, no cyanosis, atraumatic. No edema. Neuro: A&Ox3, speech clear, gait stable. Skin: Skin color is normal. No rashes or lesions.  Non diaphoretic  Vascular: 2+ pulses symmetric in all extremities    Vitals:    04/15/19 1021 04/15/19 1032   BP: (!) 140/98 (!) 152/100   Pulse: 97    Resp: 16    SpO2: 97%    Weight: 169 lb 9.6 oz (76.9 kg)    Height: 5' 8\" (1.727 m)        Cardiographics    ECG: sinus rhythm  Results for orders placed or performed during the hospital encounter of 08/10/15   EKG, 12 LEAD, INITIAL   Result Value Ref Range    Ventricular Rate 90 BPM    Atrial Rate 90 BPM    P-R Interval 158 ms    QRS Duration 90 ms    Q-T Interval 344 ms    QTC Calculation (Bezet) 420 ms    Calculated P Axis 59 degrees    Calculated R Axis 30 degrees    Calculated T Axis 36 degrees    Diagnosis       Normal sinus rhythm  Possible Left atrial enlargement  Left ventricular hypertrophy  Confirmed by Coni Mcgraw ([id number]) on 8/11/2015 6:54:34 PM         Cardiology Labs:  Lab Results   Component Value Date/Time    Cholesterol, total 218 (H) 01/31/2019 01:35 PM    HDL Cholesterol 78 01/31/2019 01:35 PM    LDL, calculated 94 01/31/2019 01:35 PM    Triglyceride 228 (H) 01/31/2019 01:35 PM       Lab Results   Component Value Date/Time    Sodium 143 01/31/2019 01:35 PM    Potassium 3.4 (L) 01/31/2019 01:35 PM    Chloride 96 01/31/2019 01:35 PM    CO2 28 01/31/2019 01:35 PM    Anion gap 8 08/10/2015 11:09 PM    Glucose 102 (H) 01/31/2019 01:35 PM    BUN 13 01/31/2019 01:35 PM    Creatinine 1.01 01/31/2019 01:35 PM    BUN/Creatinine ratio 13 01/31/2019 01:35 PM    GFR est  01/31/2019 01:35 PM    GFR est non-AA 89 01/31/2019 01:35 PM    Calcium 9.5 01/31/2019 01:35 PM    Bilirubin, total 0.4 01/31/2019 01:35 PM    AST (SGOT) 56 (H) 01/31/2019 01:35 PM    Alk. phosphatase 67 01/31/2019 01:35 PM    Protein, total 7.0 01/31/2019 01:35 PM    Albumin 4.5 01/31/2019 01:35 PM    Globulin 3.2 08/10/2015 11:09 PM    A-G Ratio 1.8 01/31/2019 01:35 PM    ALT (SGPT) 49 (H) 01/31/2019 01:35 PM           Assessment:     Assessment:       ICD-10-CM ICD-9-CM    1. Chest pain, unspecified type R07.9 786.50 AMB POC EKG ROUTINE W/ 12 LEADS, INTER & REP   2. Essential hypertension I10 401.9 lisinopril (PRINIVIL, ZESTRIL) 5 mg tablet      METABOLIC PANEL, BASIC   3. Hypercholesteremia E78.00 272.0         Plan:     1. Chest pain, unspecified type  Atypical symptoms, however with some risk factors including family history, HTN and HLD and lifestyle habits. Will obtain stress echo and echocardiogram to r/o ischemia. Discussed healthier methods to alleviate stress, such as meditation, yoga and exercise. 2. Essential hypertension  BP elevated at least 2 separate office visits, based on CC flowsheets. Will start Lisinopril 5 mg daily. F/u in 1 month with BMP. 3. Hypercholesteremia  , LDL 94,  and HDL 78. Discussed low fat, low cholesterol diet.   Will determine need for statin therapy based on results of stress test.      Moi Nance NP       Patient seen and examined by me with nurse practitioner. Jazzmine Antony personally performed all components of the history, physical, and medical decision making and agree with the assessment and plan with minor modifications as noted.     Ilda Dey MD

## 2019-04-15 NOTE — PROGRESS NOTES
1. Have you been to the ER, urgent care clinic since your last visit? Hospitalized since your last visit? NO.    2. Have you seen or consulted any other health care providers outside of the [address] [place] [address] since your last visit? Include any pap smears or colon screening. SEE BY PATIENT FIRST FOR PCP- THE [facility] LOCATION.         Chief Complaint   Patient presents with    New Patient     LF SIDE CHEST PAIN FOR ABOUT A YEAR, HEART FLUTTERINGS

## 2019-04-16 ENCOUNTER — APPOINTMENT (OUTPATIENT)
Dept: GENERAL RADIOLOGY | Age: 44
End: 2019-04-16
Attending: EMERGENCY MEDICINE
Payer: COMMERCIAL

## 2019-04-16 ENCOUNTER — HOSPITAL ENCOUNTER (EMERGENCY)
Age: 44
Discharge: HOME | End: 2019-04-16
Attending: EMERGENCY MEDICINE
Payer: COMMERCIAL

## 2019-04-16 VITALS
TEMPERATURE: 98.3 F | BODY MASS INDEX: 25.44 KG/M2 | HEART RATE: 86 BPM | SYSTOLIC BLOOD PRESSURE: 137 MMHG | RESPIRATION RATE: 20 BRPM | DIASTOLIC BLOOD PRESSURE: 70 MMHG | OXYGEN SATURATION: 98 % | WEIGHT: 167.33 LBS

## 2019-04-16 DIAGNOSIS — R07.9 ACUTE CHEST PAIN: ICD-10-CM

## 2019-04-16 DIAGNOSIS — E87.6 ACUTE HYPOKALEMIA: ICD-10-CM

## 2019-04-16 DIAGNOSIS — R07.89 ATYPICAL CHEST PAIN: Primary | ICD-10-CM

## 2019-04-16 DIAGNOSIS — Z56.6 STRESS AT WORK: ICD-10-CM

## 2019-04-16 LAB
ALBUMIN SERPL-MCNC: 4.5 G/DL (ref 3.5–5)
ALBUMIN/GLOB SERPL: 1.3 {RATIO} (ref 1.1–2.2)
ALP SERPL-CCNC: 83 U/L (ref 45–117)
ALT SERPL-CCNC: 107 U/L (ref 12–78)
ANION GAP SERPL CALC-SCNC: 6 MMOL/L (ref 5–15)
AST SERPL-CCNC: 107 U/L (ref 15–37)
BASOPHILS # BLD: 0.1 K/UL (ref 0–0.1)
BASOPHILS NFR BLD: 1 % (ref 0–1)
BILIRUB SERPL-MCNC: 0.6 MG/DL (ref 0.2–1)
BUN SERPL-MCNC: 10 MG/DL (ref 6–20)
BUN/CREAT SERPL: 11 (ref 12–20)
CALCIUM SERPL-MCNC: 9.1 MG/DL (ref 8.5–10.1)
CHLORIDE SERPL-SCNC: 101 MMOL/L (ref 97–108)
CK MB CFR SERPL CALC: 0.6 % (ref 0–2.5)
CK MB CFR SERPL CALC: 0.7 % (ref 0–2.5)
CK MB SERPL-MCNC: 2.3 NG/ML (ref 5–25)
CK MB SERPL-MCNC: 2.3 NG/ML (ref 5–25)
CK SERPL-CCNC: 343 U/L (ref 39–308)
CK SERPL-CCNC: 382 U/L (ref 39–308)
CO2 SERPL-SCNC: 33 MMOL/L (ref 21–32)
CREAT SERPL-MCNC: 0.92 MG/DL (ref 0.7–1.3)
DIFFERENTIAL METHOD BLD: NORMAL
EOSINOPHIL # BLD: 0.1 K/UL (ref 0–0.4)
EOSINOPHIL NFR BLD: 1 % (ref 0–7)
ERYTHROCYTE [DISTWIDTH] IN BLOOD BY AUTOMATED COUNT: 13.8 % (ref 11.5–14.5)
GLOBULIN SER CALC-MCNC: 3.6 G/DL (ref 2–4)
GLUCOSE SERPL-MCNC: 87 MG/DL (ref 65–100)
HCT VFR BLD AUTO: 45.5 % (ref 36.6–50.3)
HGB BLD-MCNC: 15 G/DL (ref 12.1–17)
IMM GRANULOCYTES # BLD AUTO: 0 K/UL (ref 0–0.04)
IMM GRANULOCYTES NFR BLD AUTO: 0 % (ref 0–0.5)
LYMPHOCYTES # BLD: 1.2 K/UL (ref 0.8–3.5)
LYMPHOCYTES NFR BLD: 21 % (ref 12–49)
MCH RBC QN AUTO: 28.9 PG (ref 26–34)
MCHC RBC AUTO-ENTMCNC: 33 G/DL (ref 30–36.5)
MCV RBC AUTO: 87.7 FL (ref 80–99)
MONOCYTES # BLD: 0.4 K/UL (ref 0–1)
MONOCYTES NFR BLD: 8 % (ref 5–13)
NEUTS SEG # BLD: 4 K/UL (ref 1.8–8)
NEUTS SEG NFR BLD: 69 % (ref 32–75)
NRBC # BLD: 0 K/UL (ref 0–0.01)
NRBC BLD-RTO: 0 PER 100 WBC
PLATELET # BLD AUTO: 258 K/UL (ref 150–400)
PMV BLD AUTO: 10 FL (ref 8.9–12.9)
POTASSIUM SERPL-SCNC: 3.2 MMOL/L (ref 3.5–5.1)
PROT SERPL-MCNC: 8.1 G/DL (ref 6.4–8.2)
RBC # BLD AUTO: 5.19 M/UL (ref 4.1–5.7)
SODIUM SERPL-SCNC: 140 MMOL/L (ref 136–145)
TROPONIN I SERPL-MCNC: <0.05 NG/ML
TROPONIN I SERPL-MCNC: <0.05 NG/ML
WBC # BLD AUTO: 5.7 K/UL (ref 4.1–11.1)

## 2019-04-16 PROCEDURE — 80053 COMPREHEN METABOLIC PANEL: CPT

## 2019-04-16 PROCEDURE — 82550 ASSAY OF CK (CPK): CPT

## 2019-04-16 PROCEDURE — 74011250637 HC RX REV CODE- 250/637: Performed by: EMERGENCY MEDICINE

## 2019-04-16 PROCEDURE — 93005 ELECTROCARDIOGRAM TRACING: CPT

## 2019-04-16 PROCEDURE — 82553 CREATINE MB FRACTION: CPT

## 2019-04-16 PROCEDURE — 85025 COMPLETE CBC W/AUTO DIFF WBC: CPT

## 2019-04-16 PROCEDURE — 84484 ASSAY OF TROPONIN QUANT: CPT

## 2019-04-16 PROCEDURE — 71046 X-RAY EXAM CHEST 2 VIEWS: CPT

## 2019-04-16 PROCEDURE — 99283 EMERGENCY DEPT VISIT LOW MDM: CPT

## 2019-04-16 PROCEDURE — 36415 COLL VENOUS BLD VENIPUNCTURE: CPT

## 2019-04-16 RX ORDER — POTASSIUM CHLORIDE 20 MEQ/1
40 TABLET, EXTENDED RELEASE ORAL
Status: COMPLETED | OUTPATIENT
Start: 2019-04-16 | End: 2019-04-16

## 2019-04-16 RX ORDER — AMLODIPINE BESYLATE 10 MG/1
10 TABLET ORAL DAILY
Qty: 20 TAB | Refills: 0 | Status: SHIPPED | OUTPATIENT
Start: 2019-04-16 | End: 2019-05-06

## 2019-04-16 RX ORDER — NITROGLYCERIN 0.4 MG/1
0.4 TABLET SUBLINGUAL
Qty: 1 BOTTLE | Refills: 0 | Status: SHIPPED | OUTPATIENT
Start: 2019-04-16

## 2019-04-16 RX ORDER — ASPIRIN 325 MG
325 TABLET ORAL
Status: COMPLETED | OUTPATIENT
Start: 2019-04-16 | End: 2019-04-16

## 2019-04-16 RX ORDER — DIAZEPAM 2 MG/1
2 TABLET ORAL
Qty: 20 TAB | Refills: 0 | Status: SHIPPED | OUTPATIENT
Start: 2019-04-16 | End: 2019-06-12

## 2019-04-16 RX ORDER — NAPROXEN 250 MG/1
250 TABLET ORAL 2 TIMES DAILY WITH MEALS
Qty: 20 TAB | Refills: 0 | Status: SHIPPED | OUTPATIENT
Start: 2019-04-16 | End: 2019-06-12

## 2019-04-16 RX ADMIN — ASPIRIN 325 MG: 325 TABLET ORAL at 20:33

## 2019-04-16 RX ADMIN — POTASSIUM CHLORIDE 40 MEQ: 20 TABLET, EXTENDED RELEASE ORAL at 20:22

## 2019-04-16 SDOH — HEALTH STABILITY - MENTAL HEALTH: OTHER PHYSICAL AND MENTAL STRAIN RELATED TO WORK: Z56.6

## 2019-04-16 NOTE — LETTER
[address] 
[facility] EMERGENCY DEPT 
[address] [place] [address] [address] [id number] [phone] Work/School Note Date: 4/16/2019 To Whom It May concern: 
 
Rizwana Stiles. was seen and treated today in the emergency room by the following provider(s): 
Attending Provider: David Cunningham MD.   
 
Judy Sibley Jr.'s wife may return to work on 4/17/19. Sincerely, Reyes Kothari MD

## 2019-04-17 LAB
ATRIAL RATE: 95 BPM
CALCULATED P AXIS, ECG09: 68 DEGREES
CALCULATED R AXIS, ECG10: 50 DEGREES
CALCULATED T AXIS, ECG11: 51 DEGREES
DIAGNOSIS, 93000: NORMAL
P-R INTERVAL, ECG05: 162 MS
Q-T INTERVAL, ECG07: 352 MS
QRS DURATION, ECG06: 96 MS
QTC CALCULATION (BEZET), ECG08: 442 MS
VENTRICULAR RATE, ECG03: 95 BPM

## 2019-04-17 NOTE — DISCHARGE INSTRUCTIONS
Thank you for allowing us to take care of you today! We hope we addressed all of your concerns and needs. We strive to provide excellent quality care in the Emergency Department. You will receive a survey after your visit to evaluate the care you were provided. Should you receive a survey from us, we invite you to share your experience and tell us what made it excellent. It was a pleasure serving you, we invite you to share your experience with us, in our pursuit for excellence, should you be selected to receive a survey. The exam and treatment you received in the Emergency Department were for an urgent problem and are not intended as complete care. It is important that you follow up with a doctor, nurse practitioner, or physician assistant for ongoing care. If your symptoms become worse or you do not improve as expected and you are unable to reach your usual health care provider, you should return to the Emergency Department. We are available 24 hours a day. Please take your discharge instructions with you when you go to your follow-up appointment. If you have any problem arranging a follow-up appointment, contact the Emergency Department immediately. If a prescription has been provided, please have it filled as soon as possible to prevent a delay in treatment. Read the entire medication instruction sheet provided to you by the pharmacy. If you have any questions or reservations about taking the medication due to side effects or interactions with other medications, please call your primary care physician or contact the ER to speak with the charge nurse. Make an appointment with your family doctor or the physician you were referred to for follow-up of this visit as instructed on your discharge paperwork, as this is mandatory follow-up. Return to the ER if you are unable to be seen or if you are unable to be seen in a timely manner.     If you have any problem arranging the follow-up visit, contact the Emergency Department immediately. I hope you feel better and thank you again for allow us to provide you with excellent care today at [facility]! Warmest regards,    Chaparro Condon MD  Emergency Medicine Physician  [facility]              Patient Education        Musculoskeletal Chest Pain: Care Instructions  Your Care Instructions    Chest pain is not always a sign that something is wrong with your heart or that you have another serious problem. The doctor thinks your chest pain is caused by strained muscles or ligaments, inflamed chest cartilage, or another problem in your chest, rather than by your heart. You may need more tests to find the cause of your chest pain. Follow-up care is a key part of your treatment and safety. Be sure to make and go to all appointments, and call your doctor if you are having problems. It's also a good idea to know your test results and keep a list of the medicines you take. How can you care for yourself at home? · Take pain medicines exactly as directed. ? If the doctor gave you a prescription medicine for pain, take it as prescribed. ? If you are not taking a prescription pain medicine, ask your doctor if you can take an over-the-counter medicine. · Rest and protect the sore area. · Stop, change, or take a break from any activity that may be causing your pain or soreness. · Put ice or a cold pack on the sore area for 10 to 20 minutes at a time. Try to do this every 1 to 2 hours for the next 3 days (when you are awake) or until the swelling goes down. Put a thin cloth between the ice and your skin. · After 2 or 3 days, apply a heating pad set on low or a warm cloth to the area that hurts. Some doctors suggest that you go back and forth between hot and cold. · Do not wrap or tape your ribs for support.  This may cause you to take smaller breaths, which could increase your risk of lung problems. · Mentholated creams such as Bengay or Icy Hot may soothe sore muscles. Follow the instructions on the package. · Follow your doctor's instructions for exercising. · Gentle stretching and massage may help you get better faster. Stretch slowly to the point just before pain begins, and hold the stretch for at least 15 to 30 seconds. Do this 3 or 4 times a day. Stretch just after you have applied heat. · As your pain gets better, slowly return to your normal activities. Any increased pain may be a sign that you need to rest a while longer. When should you call for help? Call 911 anytime you think you may need emergency care. For example, call if:    · You have chest pain or pressure. This may occur with:  ? Sweating. ? Shortness of breath. ? Nausea or vomiting. ? Pain that spreads from the chest to the neck, jaw, or one or both shoulders or arms. ? Dizziness or lightheadedness. ? A fast or uneven pulse. After calling 911, chew 1 adult-strength aspirin. Wait for an ambulance. Do not try to drive yourself.     · You have sudden chest pain and shortness of breath, or you cough up blood.    Call your doctor now or seek immediate medical care if:    · You have any trouble breathing.     · Your chest pain gets worse.     · Your chest pain occurs consistently with exercise and is relieved by rest.    Watch closely for changes in your health, and be sure to contact your doctor if:    · Your chest pain does not get better after 1 week. Where can you learn more? Go to http://jc-fuad.info/. Enter V293 in the search box to learn more about \"Musculoskeletal Chest Pain: Care Instructions. \"  Current as of: September 23, 2018  Content Version: 11.9  © 4171-2571 [employer]. Care instructions adapted under license by [employer] (which disclaims liability or warranty for this information).  If you have questions about a medical condition or this instruction, always ask your healthcare professional. [place] [address] any warranty or liability for your use of this information. Patient Education        Chest Pain: Care Instructions  Your Care Instructions    There are many things that can cause chest pain. Some are not serious and will get better on their own in a few days. But some kinds of chest pain need more testing and treatment. Your doctor may have recommended a follow-up visit in the next 8 to 12 hours. If you are not getting better, you may need more tests or treatment. Even though your doctor has released you, you still need to watch for any problems. The doctor carefully checked you, but sometimes problems can develop later. If you have new symptoms or if your symptoms do not get better, get medical care right away. If you have worse or different chest pain or pressure that lasts more than 5 minutes or you passed out (lost consciousness), call 911 or seek other emergency help right away. A medical visit is only one step in your treatment. Even if you feel better, you still need to do what your doctor recommends, such as going to all suggested follow-up appointments and taking medicines exactly as directed. This will help you recover and help prevent future problems. How can you care for yourself at home? · Rest until you feel better. · Take your medicine exactly as prescribed. Call your doctor if you think you are having a problem with your medicine. · Do not drive after taking a prescription pain medicine. When should you call for help? Call 911 if:    · You passed out (lost consciousness).     · You have severe difficulty breathing.     · You have symptoms of a heart attack. These may include:  ? Chest pain or pressure, or a strange feeling in your chest.  ? Sweating. ? Shortness of breath. ? Nausea or vomiting.   ? Pain, pressure, or a strange feeling in your back, neck, jaw, or upper belly or in one or both shoulders or arms.  ? Lightheadedness or sudden weakness. ? A fast or irregular heartbeat. After you call 911, the  may tell you to chew 1 adult-strength or 2 to 4 low-dose aspirin. Wait for an ambulance. Do not try to drive yourself.    Call your doctor today if:    · You have any trouble breathing.     · Your chest pain gets worse.     · You are dizzy or lightheaded, or you feel like you may faint.     · You are not getting better as expected.     · You are having new or different chest pain. Where can you learn more? Go to http://jc-fuad.info/. Enter A120 in the search box to learn more about \"Chest Pain: Care Instructions. \"  Current as of: September 23, 2018  Content Version: 11.9  © 6238-7559 [employer]. Care instructions adapted under license by [employer] (which disclaims liability or warranty for this information). If you have questions about a medical condition or this instruction, always ask your healthcare professional. [address] any warranty or liability for your use of this information.

## 2019-04-17 NOTE — ED PROVIDER NOTES
EMERGENCY DEPARTMENT HISTORY AND PHYSICAL EXAM 
 
 
Date: 4/16/2019 Patient Name: Sammi Michaels. Patient Age and Sex: 37 y.o. male History of Presenting Illness Chief Complaint Patient presents with  Chest Pain  
  pt reports chest pain around 1pm today, reports he just woke up and walked outside and pain started, reports pain radiated down left arm, has been happening several times a week for several months History Provided By: Patient HPI: Sammi Michaels., 37 y.o. male with PMHx significant for hypertension, recent cardiology evaluation by Dr. Thuy Shaffer yesterday and initiated on lisinopril ambulatory to the emergency room with 2 episodes of chest pain today. Patient states his first episode of chest pain was around 1 PM today when he noticed that left-sided chest pain with radiation down the left arm. Patient's pain was 8 out of 10 in intensity. Patient states that he back to bed and woke up around 4 PM with another episode of pain. This pain persisted for several hours. Patient denies any pleuritic type chest pain. Denies any shortness of breath or lower extremity edema. Patient denies take any medications prior to arrival for symptoms. Patient did start taking his lisinopril yesterday and is concerned he is developing side effects from that as he is making him generally fatigued. He does denies any lower extremity edema, recent travel, current tobacco use. Patient's wife who is a nurse states that he is scheduled for a stress echo next week on Thursday. Patient states that there is significant family history of early MI. He also notes that he is a  and is under a lot of stress frequently. He is concerned that this is exacerbating his symptoms. Pt denies any other alleviating or exacerbating factors.  Additionally, pt specifically denies any recent fever, chills, headache, nausea, vomiting, diarrhea, abdominal pain,  SOB, lightheadedness, dizziness, numbness, weakness, tingling, BLE swelling, heart palpitations, urinary sxs, changes in BM, changes in PO intake, melena, hematochezia, cough, or congestion. PCP: Rodrigo Navarro MD 
 
There are no other complaints, changes or physical findings at this time. Past History Past Medical History: Hypertension Past Surgical History: 
Past Surgical History:  
Procedure Laterality Date  HX ORTHOPAEDIC    
 scoliosis surgery Family History: 
Family History Problem Relation Age of Onset  Diabetes Mother  Heart Disease Mother  Hypertension Mother  Heart Attack Mother  Diabetes Father  Hypertension Father  Pancreatic Cancer Paternal Grandmother Social History: 
Social History Tobacco Use  Smoking status: Former Smoker Types: Cigarettes Last attempt to quit: 1996 Years since quittin.9  Smokeless tobacco: Current User Types: Snuff Substance Use Topics  Alcohol use: Yes Alcohol/week: 2.4 oz Types: 4 Cans of beer per week Comment: 4-5 BEERS NIGHTLY  Drug use: No  
 
 
Allergies: 
No Known Allergies Medications: No current facility-administered medications on file prior to encounter. Current Outpatient Medications on File Prior to Encounter Medication Sig Dispense Refill  lisinopril (PRINIVIL, ZESTRIL) 5 mg tablet Take 1 Tab by mouth daily. 30 Tab 1  
 ondansetron (ZOFRAN ODT) 4 mg disintegrating tablet Take 1 Tab by mouth every eight (8) hours as needed for Nausea. 40 Tab 0 Review of Systems Review of Systems Constitutional: Negative. Negative for chills and fever. HENT: Negative. Negative for congestion, facial swelling, rhinorrhea, sore throat, trouble swallowing and voice change. Eyes: Negative. Respiratory: Positive for chest tightness. Negative for apnea, cough, shortness of breath and wheezing. Cardiovascular: Positive for chest pain. Negative for palpitations and leg swelling. Gastrointestinal: Negative. Negative for abdominal distention, abdominal pain, blood in stool, constipation, diarrhea, nausea and vomiting. Endocrine: Negative. Negative for cold intolerance, heat intolerance and polyuria. Genitourinary: Negative. Negative for difficulty urinating, dysuria, flank pain, frequency, hematuria and urgency. Musculoskeletal: Negative. Negative for arthralgias, back pain, myalgias, neck pain and neck stiffness. Skin: Negative. Negative for color change and rash. Neurological: Negative. Negative for dizziness, syncope, facial asymmetry, speech difficulty, weakness, light-headedness, numbness and headaches. Hematological: Negative. Does not bruise/bleed easily. Psychiatric/Behavioral: Negative. Negative for confusion and self-injury. The patient is not nervous/anxious. Physical Exam  
Physical Exam  
Constitutional: He is oriented to person, place, and time. Vital signs are normal. He appears well-developed and well-nourished. He is cooperative. Non-toxic appearance. HENT:  
Head: Normocephalic and atraumatic. Mouth/Throat: Mucous membranes are normal. No posterior oropharyngeal erythema. Eyes: Pupils are equal, round, and reactive to light. Conjunctivae and EOM are normal.  
Neck: Normal range of motion. Cardiovascular: Normal rate, regular rhythm, normal heart sounds and intact distal pulses. Exam reveals no gallop and no friction rub. No murmur heard. Pulmonary/Chest: Effort normal and breath sounds normal. No respiratory distress. He has no wheezes. He has no rales. He exhibits no tenderness. Abdominal: Soft. Bowel sounds are normal. He exhibits no distension and no mass. There is no tenderness. There is no rebound and no guarding. Musculoskeletal: Normal range of motion. He exhibits no edema, tenderness or deformity. Neurological: He is alert and oriented to person, place, and time. He displays normal reflexes. No cranial nerve deficit. He exhibits normal muscle tone. Coordination normal.  
Skin: Skin is warm. No rash noted. Psychiatric: He has a normal mood and affect. Nursing note and vitals reviewed. Diagnostic Study Results Labs - Recent Results (from the past 24 hour(s)) EKG, 12 LEAD, INITIAL Collection Time: 04/16/19  5:26 PM  
Result Value Ref Range Ventricular Rate 95 BPM  
 Atrial Rate 95 BPM  
 P-R Interval 162 ms QRS Duration 96 ms  
 Q-T Interval 352 ms QTC Calculation (Bezet) 442 ms Calculated P Axis 68 degrees Calculated R Axis 50 degrees Calculated T Axis 51 degrees Diagnosis Normal sinus rhythm Incomplete right bundle branch block When compared with ECG of 10-AUG-2015 22:58, No significant change was found CBC WITH AUTOMATED DIFF Collection Time: 04/16/19  7:07 PM  
Result Value Ref Range WBC 5.7 4.1 - 11.1 K/uL  
 RBC 5.19 4. 10 - 5.70 M/uL  
 HGB 15.0 12.1 - 17.0 g/dL HCT 45.5 36.6 - 50.3 % MCV 87.7 80.0 - 99.0 FL  
 MCH 28.9 26.0 - 34.0 PG  
 MCHC 33.0 30.0 - 36.5 g/dL  
 RDW 13.8 11.5 - 14.5 % PLATELET 077 282 - 883 K/uL MPV 10.0 8.9 - 12.9 FL  
 NRBC 0.0 0  WBC ABSOLUTE NRBC 0.00 0.00 - 0.01 K/uL NEUTROPHILS 69 32 - 75 % LYMPHOCYTES 21 12 - 49 % MONOCYTES 8 5 - 13 % EOSINOPHILS 1 0 - 7 % BASOPHILS 1 0 - 1 % IMMATURE GRANULOCYTES 0 0.0 - 0.5 % ABS. NEUTROPHILS 4.0 1.8 - 8.0 K/UL  
 ABS. LYMPHOCYTES 1.2 0.8 - 3.5 K/UL  
 ABS. MONOCYTES 0.4 0.0 - 1.0 K/UL  
 ABS. EOSINOPHILS 0.1 0.0 - 0.4 K/UL  
 ABS. BASOPHILS 0.1 0.0 - 0.1 K/UL  
 ABS. IMM. GRANS. 0.0 0.00 - 0.04 K/UL  
 DF AUTOMATED METABOLIC PANEL, COMPREHENSIVE Collection Time: 04/16/19  7:07 PM  
Result Value Ref Range Sodium 140 136 - 145 mmol/L Potassium 3.2 (L) 3.5 - 5.1 mmol/L  Chloride 101 97 - 108 mmol/L  
 CO2 33 (H) 21 - 32 mmol/L  
 Anion gap 6 5 - 15 mmol/L Glucose 87 65 - 100 mg/dL BUN 10 6 - 20 MG/DL Creatinine 0.92 0.70 - 1.30 MG/DL  
 BUN/Creatinine ratio 11 (L) 12 - 20 GFR est AA >60 >60 ml/min/1.73m2 GFR est non-AA >60 >60 ml/min/1.73m2 Calcium 9.1 8.5 - 10.1 MG/DL Bilirubin, total 0.6 0.2 - 1.0 MG/DL  
 ALT (SGPT) 107 (H) 12 - 78 U/L  
 AST (SGOT) 107 (H) 15 - 37 U/L Alk. phosphatase 83 45 - 117 U/L Protein, total 8.1 6.4 - 8.2 g/dL Albumin 4.5 3.5 - 5.0 g/dL Globulin 3.6 2.0 - 4.0 g/dL A-G Ratio 1.3 1.1 - 2.2 CK W/ REFLX CKMB Collection Time: 04/16/19  7:07 PM  
Result Value Ref Range  (H) 39 - 308 U/L  
TROPONIN I Collection Time: 04/16/19  7:07 PM  
Result Value Ref Range Troponin-I, Qt. <0.05 <0.05 ng/mL CK-MB,QUANT. Collection Time: 04/16/19  7:07 PM  
Result Value Ref Range CK - MB 2.3 <3.6 NG/ML  
 CK-MB Index 0.6 0.0 - 2.5    
TROPONIN I Collection Time: 04/16/19  8:39 PM  
Result Value Ref Range Troponin-I, Qt. <0.05 <0.05 ng/mL CK W/ REFLX CKMB Collection Time: 04/16/19  8:39 PM  
Result Value Ref Range  (H) 39 - 308 U/L  
CK-MB,QUANT. Collection Time: 04/16/19  8:39 PM  
Result Value Ref Range CK - MB 2.3 <3.6 NG/ML  
 CK-MB Index 0.7 0.0 - 2.5 Radiologic Studies -  
XR CHEST PA LAT Final Result IMPRESSION:   
  
No acute process. CT Results  (Last 48 hours) None CXR Results  (Last 48 hours) 04/16/19 1821  XR CHEST PA LAT Final result Impression:  IMPRESSION:   
   
No acute process. Narrative:  EXAM:  XR CHEST PA LAT INDICATION:  chest pain, onset this afternoon, radiation to the left arm COMPARISON:  8/10/2015 FINDINGS:  
   
PA and lateral radiographs of the chest demonstrate clear lungs. The cardiac  
and mediastinal contours and pulmonary vascularity are normal.  Scoliosis and  
postsurgical changes are present in the thoracic spine. Medical Decision Making I am the first provider for this patient. I reviewed the vital signs, available nursing notes, past medical history, past surgical history, family history and social history. Vital Signs-Reviewed the patient's vital signs. Patient Vitals for the past 24 hrs: 
 Temp Pulse Resp BP SpO2  
04/16/19 2050 98.3 °F (36.8 °C) 86 20 137/70 98 % 04/16/19 1736 97.9 °F (36.6 °C) (!) 108 16 127/87 97 % Pulse Oximetry Analysis - 97% on RA Cardiac Monitor:  
Rate: 108 bpm 
Rhythm: Sinus Tachycardia ED EKG interpretation: 
Rhythm: normal sinus rhythm; and regular . Rate (approx.): 95; Axis: normal; P wave: normal; QRS interval: normal ; ST/T wave: normal; Other findings: incomplete RBBB. This EKG was interpreted by Earl Pino M.D. Records Reviewed: Nursing Notes, Old Medical Records, Previous electrocardiograms, Previous Radiology Studies and Previous Laboratory Studies Provider Notes (Medical Decision Making): DDx includes ACS,NSTEMI, Angina, PE, Aortic Pathology, Chest Wall Pain, Pleurisy, Pneumonia, GERD/esophagitis, Anxiety. No cough/fever or focal lung findings to suggest pneumonia. No tachycardia, hypoxia or pleuritic component to suggest PE. Pulses symmetric and no extremely elevated BP/asymmetry or classic tearing sensation to suggest Aortic Dissection. Also, no neuro findings. No wretching/forceful vomiting to suggest esophageal disaster. Denies IV drug abuse, has native valves, no fevers/murmurs or skin lesions to suggest endocarditis. Will evaluate with EKG, labs, cardiac enzymes, chest x-ray. Will provide pain control and reassess. ED Course:  
Initial assessment performed. The patients presenting problems have been discussed, and they are in agreement with the care plan formulated and outlined with them. I have encouraged them to ask questions as they arise throughout their visit.  
 
TOBACCO COUNSELING:  
 Upon evaluation, pt expressed that they are a current tobacco user. For approximately 10 minutes, pt has been counseled on the dangers of smoking and was encouraged to quit as soon as possible in order to decrease further risks to their health. Pt has conveyed their understanding of the risks involved should they continue to use tobacco products. ALCOHOL/SUBSTANCE ABUSE COUNSELING: 
Upon evaluation, pt endorsed recent alcohol/illicit drug use. For approximately 15 minutes, pt has been counseled on the dangers of alcohol and illicit drug use on their health, and they were encouraged to quit as soon as possible in order to decrease further risks to their health. Pt has conveyed their understanding of the risks involved should they continue to use these products. I reviewed our electronic medical record system for any past medical records that were available that may contribute to the patient's current condition, the nursing notes and vital signs from today's visit. Curt Falcon MD 
Medications Administered During ED Course: 
Medications  
aspirin tablet 325 mg (325 mg Oral Given 4/16/19 2033) potassium chloride (K-DUR, KLOR-CON) SR tablet 40 mEq (40 mEq Oral Given 4/16/19 2022) Progress Note: 
Patient has been reassessed and reports feeling better and symptoms have improved after ED treatment. Haydee Ashford. is able to tolerate PO and ambulate per baseline. Trenna Cooks Jr.'s final labs and imaging have been reviewed with him. He has been counseled regarding his diagnosis. He verbally conveys understanding and agreement of the signs, symptoms, diagnosis, treatment and prognosis and additionally agrees to follow up as recommended with Dr. Ashley Frost MD in 24 - 48 hours. He also agrees with the care-plan and conveys that all of his questions have been answered.   I have also put together some discharge instructions for him that include: 1) educational information regarding their diagnosis, 2) how to care for their diagnosis at home, as well a 3) list of reasons why they would want to return to the ED prior to their follow-up appointment, should their condition change. I have answered all questions to the patient's satisfaction. Strict return precautions given. He both understood and agreed with plan as discussed above. Vital signs stable for discharge. Disposition: DISCHARGE The pt is ready for discharge. The pt's signs, symptoms, diagnosis, and discharge instructions have been discussed and pt has conveyed their understanding. The pt is to follow up as recommended or return to ER should their symptoms worsen. Plan has been discussed and pt is in agreement. PLAN: 
1. Discharge Medication List as of 4/16/2019  9:58 PM  
  
CONTINUE these medications which have NOT CHANGED Details  
lisinopril (PRINIVIL, ZESTRIL) 5 mg tablet Take 1 Tab by mouth daily. , Normal, Disp-30 Tab, R-1  
  
ondansetron (ZOFRAN ODT) 4 mg disintegrating tablet Take 1 Tab by mouth every eight (8) hours as needed for Nausea., Normal, Disp-40 Tab, R-0  
  
  
 
2. Follow-up Information Follow up With Specialties Details Why Contact Info Fariba Delarosa MD [facility]   [address] [facility] [address] [phone] [facility] EMERGENCY DEPT Emergency Medicine  As needed, If symptoms worsen [address] [facility] 
[phone] Ledy Lujan MD Cardiology Schedule an appointment as soon as possible for a visit in 1 day  [address] [place] [address] 
[phone] Return to ED if worse Diagnosis Clinical Impression: 1. Atypical chest pain 2. Acute chest pain 3. Acute hypokalemia 4. Stress at work Attestation: 
 
I personally performed the services described in this documentation on this date 4/16/2019 for patient Heath Catherine. . I have reviewed and verified that the information is accurate and complete. Barbara Siemens, MD 
 
Please note that this dictation was completed with [employer], the computer voice recognition software. Quite often unanticipated grammatical, syntax, homophones, and other interpretive errors are inadvertently transcribed by the computer software. Please disregard these errors. Please excuse any errors that have escaped final proofreading. Thank you. This note will not be viewable in [address]

## 2019-04-22 ENCOUNTER — TELEPHONE (OUTPATIENT)
Dept: CARDIOLOGY CLINIC | Age: 44
End: 2019-04-22

## 2019-04-22 NOTE — TELEPHONE ENCOUNTER
----- Message from Pavel Celeste NP sent at 4/19/2019  3:10 PM EDT -----  Jimmie Olivas,    Please call the patient and let him know his echocardiogram is normal.  There is no concern for heart failure. He has normal functioning of his heart valves. One of his heart chambers (the right atrium) is a little enlarged. I am not too concerned about this at this time. Will call back with results of stress test when completed. Thanks,  Frank Valentino pt and left message to call me back. Wife answered the phone but she is not on the HPI form. Pt returned my call,verified pt with two pt identifiers, relayed message of echo above to pt. Pt then advised he started the Lisinopril on 4/15/19 after his visit. He stated it made his HR elevate to 122 and he had chest pain. He went to ER on 4/16/19. He then advised that his HR is staying elevated, he has had headaches and his pharmacist noted it looked like his face was swollen. He stopped his Lisinopril on 4/19/19. No trouble swallowing or tongue swelling. His /102 and  today. I advised not to take the Lisinopril any more since he has noted facial swelling and headaches. He then advised that he was prescribed Norvasc 10 mg daily while at ER. I advised him to take that as that will help lower his BP. Advised I would send a note to  for further advise and call him tomorrow. Pt verbalized understanding. Spoke to  and he gave verbal to stop Lisinopril and continue Norvasc. Called pt,verified pt with two pt identifiers, told pt that  advised to stop the Lisinopril and continue the Norvasc. Pt advised he would start the Norvasc. Pt verbalized understanding.

## 2019-05-14 ENCOUNTER — TELEPHONE (OUTPATIENT)
Dept: CARDIOLOGY CLINIC | Age: 44
End: 2019-05-14

## 2019-05-14 RX ORDER — AMLODIPINE BESYLATE 10 MG/1
TABLET ORAL DAILY
COMMUNITY
End: 2019-05-14 | Stop reason: SDUPTHER

## 2019-05-14 RX ORDER — AMLODIPINE BESYLATE 10 MG/1
10 TABLET ORAL DAILY
Qty: 30 TAB | Refills: 2 | Status: SHIPPED | OUTPATIENT
Start: 2019-05-14 | End: 2019-06-12 | Stop reason: SDUPTHER

## 2019-05-14 NOTE — TELEPHONE ENCOUNTER
Pt needs norvasac refill sent to [facility]. Pt has 1 pill left.     Thanks, [facility]

## 2019-06-12 ENCOUNTER — OFFICE VISIT (OUTPATIENT)
Dept: FAMILY MEDICINE CLINIC | Age: 44
End: 2019-06-12

## 2019-06-12 VITALS
OXYGEN SATURATION: 98 % | TEMPERATURE: 98.3 F | DIASTOLIC BLOOD PRESSURE: 82 MMHG | RESPIRATION RATE: 17 BRPM | SYSTOLIC BLOOD PRESSURE: 112 MMHG | HEIGHT: 68 IN | WEIGHT: 167 LBS | BODY MASS INDEX: 25.31 KG/M2 | HEART RATE: 90 BPM

## 2019-06-12 DIAGNOSIS — T21.22XA PARTIAL THICKNESS BURN OF ABDOMEN, INITIAL ENCOUNTER: Primary | ICD-10-CM

## 2019-06-12 DIAGNOSIS — I10 ESSENTIAL HYPERTENSION: ICD-10-CM

## 2019-06-12 RX ORDER — CEPHALEXIN 500 MG/1
500 CAPSULE ORAL 3 TIMES DAILY
Qty: 30 CAP | Refills: 0 | Status: SHIPPED | OUTPATIENT
Start: 2019-06-12 | End: 2019-06-22

## 2019-06-12 RX ORDER — AMLODIPINE BESYLATE 10 MG/1
10 TABLET ORAL DAILY
Qty: 90 TAB | Refills: 3 | Status: SHIPPED | OUTPATIENT
Start: 2019-06-12 | End: 2019-06-18 | Stop reason: SDUPTHER
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
## 2019-06-12 NOTE — PATIENT INSTRUCTIONS
Major Best: Care Instructions  Your Care Instructions    Burns injure the skin and can also injure other parts of your body, such as your muscles, nerves, lungs, and eyes. Burns may also become infected easily. Pain from a burn may get worse in the first few weeks as the burn heals. The color, texture, and feel of your skin will change as new skin and scar tissue form. You may notice that the burned area feels tight and hard while it is healing. It is important to continue to move the area as the burn heals to prevent loss of motion or loss of function in the area. Complete healing of a burn may take from a few months to up to a year. Recovering from a burn can be a painful and trying process, but there are steps you can take to make sure that the burn heals as well as possible. Follow-up care is a key part of your treatment and safety. Be sure to make and go to all appointments, and call your doctor if you are having problems. It's also a good idea to know your test results and keep a list of the medicines you take. How can you care for yourself at home? · Follow your doctor's instructions for caring for your burn. You may need special bandages or a compression garment if you have a very deep burn. · Keep your burn clean and dry. ? Wash the burn every day with a mild soap and water. ? Gently pat the burn dry after you wash and rinse it. · Protect your burn while it is healing. Cover your burn if you are going out in the cold or the sun.  ? If the burn is on your hands or arms, wear long sleeves. ? Wear a hat if the burn is on your face. ? Wear shoes and socks if the burn is on your feet. · If your doctor prescribed antibiotics, take them as directed. Do not stop taking them just because you feel better. You need to take the full course of antibiotics. · Take an over-the-counter pain medicine, such as acetaminophen (Tylenol), ibuprofen (Advil, Motrin), or naproxen (Aleve), as needed.  Read and follow all instructions on the label. Do not use aspirin, because it can make bleeding in the burned area worse. · Do not take two or more pain medicines at the same time unless the doctor told you to. Many pain medicines have acetaminophen, which is Tylenol. Too much acetaminophen (Tylenol) can be harmful. · Do not scratch your burn. Talk to your doctor about what to use on your burn for itching. · Drink plenty of fluids. If you have kidney, heart, or liver disease and have to limit fluids, talk with your doctor before you increase the amount of fluids you drink. · Eat a healthy diet. Make sure that you are eating foods that have enough calories and protein to promote healing. Ask your doctor if you should take any extra vitamins or other supplements. · Do not smoke. Smoking slows healing and delays tissue repair. If you need help quitting, talk to your doctor about stop-smoking programs and medicines. These can increase your chances of quitting for good. When should you call for help? Call your doctor now or seek immediate medical care if:    · You have signs of infection, such as:  ? Increased pain, swelling, warmth, or redness. ? Red streaks leading from the burn. ? Pus draining from the burn. ? A fever.     · You cannot move the burned area, or the area feels numb.    Watch closely for changes in your health, and be sure to contact your doctor if:    · You do not get better as expected. Where can you learn more? Go to http://jc-fuad.info/. Enter C266 in the search box to learn more about \"Major Best: Care Instructions. \"  Current as of: September 23, 2018  Content Version: 11.9  © 4823-9402 [employer] Care instructions adapted under license by [employer] (which disclaims liability or warranty for this information).  If you have questions about a medical condition or this instruction, always ask your healthcare professional. Brooklynn Cooper disclaims any warranty or liability for your use of this information.

## 2019-06-14 PROBLEM — I10 ESSENTIAL HYPERTENSION: Status: ACTIVE | Noted: 2019-06-14

## 2019-06-18 ENCOUNTER — TELEPHONE (OUTPATIENT)
Dept: FAMILY MEDICINE CLINIC | Age: 44
End: 2019-06-18

## 2019-06-18 RX ORDER — AMLODIPINE BESYLATE 10 MG/1
10 TABLET ORAL DAILY
Qty: 90 TAB | Refills: 3 | Status: SHIPPED | OUTPATIENT
Start: 2019-06-18

## 2019-06-18 NOTE — TELEPHONE ENCOUNTER
Patient stated that he would like for his Norvasc to be called into [facility] because he is completely out of medication.

## 2020-07-09 ENCOUNTER — VIRTUAL VISIT (OUTPATIENT)
Dept: FAMILY MEDICINE CLINIC | Age: 45
End: 2020-07-09

## 2020-07-09 DIAGNOSIS — F10.21 ALCOHOL DEPENDENCE IN REMISSION (HCC): ICD-10-CM

## 2020-07-09 DIAGNOSIS — F43.0 ACUTE STRESS REACTION: Primary | ICD-10-CM

## 2020-07-09 RX ORDER — DISULFIRAM 250 MG/1
250 TABLET ORAL DAILY
Qty: 30 TAB | Refills: 2 | Status: SHIPPED | OUTPATIENT
Start: 2020-07-09 | End: 2020-08-04 | Stop reason: SDUPTHER

## 2020-07-09 RX ORDER — SERTRALINE HYDROCHLORIDE 50 MG/1
50 TABLET, FILM COATED ORAL DAILY
Qty: 30 TAB | Refills: 2 | Status: SHIPPED | OUTPATIENT
Start: 2020-07-09 | End: 2020-08-04 | Stop reason: SDUPTHER

## 2020-07-09 NOTE — PROGRESS NOTES
Chief Complaint   Patient presents with    Depression    Anxiety     Patient was evaluated today via phone call only. Patient reports that he has been struggling with increased stress and anxiety. Patient has wife have recently decided to go to marriage counseling. Patient reports that his alcohol use had increased, to the point that it became a problem. Patient reports that he started attending [place] [address] meetings, has made significant improvements in his drinking and has recently even stopped altogether. Patient reports that he feels like his stress and mood was part of what started the drinking issues to begin with and he hopes to address the matter going forward. Patient is very concerned about being on medication that could affect his job as a , does not want to be on anything that is sedating. Patient is also interested in trying disulfiram to keep him honest as he works to stop drinking. John Paulino is a 53 y.o. male, evaluated via audio-only technology on 7/9/2020 for Depression and Anxiety  . Assessment & Plan:   Diagnoses and all orders for this visit:    1. Acute stress reaction  -     sertraline (ZOLOFT) 50 mg tablet; Take 1 Tab by mouth daily. 2. Alcohol dependence in remission ([facility] [address]  -     disulfiram (ANTABUSE) 250 mg tablet; Take 1 Tab by mouth daily. 12  Subjective:       Prior to Admission medications    Medication Sig Start Date End Date Taking? Authorizing Provider   sertraline (ZOLOFT) 50 mg tablet Take 1 Tab by mouth daily. 7/9/20  Yes Ling Staples MD   disulfiram (ANTABUSE) 250 mg tablet Take 1 Tab by mouth daily. 7/9/20  Yes Ling Staples MD   amLODIPine (NORVASC) 10 mg tablet Take 1 Tab by mouth daily. 6/18/19  Yes Ling Staples MD   nitroglycerin (NITROSTAT) 0.4 mg SL tablet Take 1 Tab by mouth every five (5) minutes as needed for Chest Pain.  Sit/Lay down then put one tab under the tongue every 5 minutes as needed for chest pain for 3 doses 4/16/19  Yes Carlos Webber MD     Patient Active Problem List   Diagnosis Code    Essential hypertension I10       Review of Systems   Constitutional: Negative for chills, fever and malaise/fatigue. HENT: Negative for congestion and sore throat. Respiratory: Negative for cough and shortness of breath. Cardiovascular: Negative for chest pain and palpitations. Gastrointestinal: Negative for abdominal pain, heartburn, nausea and vomiting. Genitourinary: Negative for dysuria and urgency. Musculoskeletal: Negative for joint pain and myalgias. Neurological: Negative for dizziness, tingling and headaches. Psychiatric/Behavioral: Positive for depression and substance abuse. Negative for suicidal ideas. The patient is nervous/anxious and has insomnia. All other systems reviewed and are negative. No flowsheet data found. Jenn Romberg., who was evaluated through a patient-initiated, synchronous (real-time) audio only encounter, and/or her healthcare decision maker, is aware that it is a billable service, with coverage as determined by his insurance carrier. He provided verbal consent to proceed: Yes. He has not had a related appointment within my department in the past 7 days or scheduled within the next 24 hours.       Total Time: minutes: 21-30 minutes    Maribell Oscar MD

## 2020-07-09 NOTE — PROGRESS NOTES
1. Have you been to the ER, urgent care clinic since your last visit? Hospitalized since your last visit? No    2. Have you seen or consulted any other health care providers outside of the [address] [place] [address] since your last visit? Include any pap smears or colon screening. No     There are no preventive care reminders to display for this patient. Patient consents to student observation.

## 2020-09-01 ENCOUNTER — OFFICE VISIT (OUTPATIENT)
Dept: FAMILY MEDICINE CLINIC | Age: 45
End: 2020-09-01
Payer: COMMERCIAL

## 2020-09-01 VITALS
OXYGEN SATURATION: 97 % | HEIGHT: 68 IN | WEIGHT: 159 LBS | RESPIRATION RATE: 20 BRPM | SYSTOLIC BLOOD PRESSURE: 156 MMHG | DIASTOLIC BLOOD PRESSURE: 97 MMHG | TEMPERATURE: 97.6 F | BODY MASS INDEX: 24.1 KG/M2 | HEART RATE: 89 BPM

## 2020-09-01 DIAGNOSIS — F10.21 ALCOHOL DEPENDENCE IN REMISSION (HCC): ICD-10-CM

## 2020-09-01 DIAGNOSIS — Z00.00 ROUTINE GENERAL MEDICAL EXAMINATION AT A HEALTH CARE FACILITY: Primary | ICD-10-CM

## 2020-09-01 DIAGNOSIS — R10.2 PERINEAL PAIN: ICD-10-CM

## 2020-09-01 DIAGNOSIS — Z11.3 SCREEN FOR STD (SEXUALLY TRANSMITTED DISEASE): ICD-10-CM

## 2020-09-01 DIAGNOSIS — F43.0 ACUTE STRESS REACTION: ICD-10-CM

## 2020-09-01 PROCEDURE — 99396 PREV VISIT EST AGE 40-64: CPT | Performed by: FAMILY MEDICINE

## 2020-09-01 PROCEDURE — 99000 SPECIMEN HANDLING OFFICE-LAB: CPT | Performed by: FAMILY MEDICINE

## 2020-09-01 PROCEDURE — 36415 COLL VENOUS BLD VENIPUNCTURE: CPT | Performed by: FAMILY MEDICINE

## 2020-09-01 RX ORDER — SULFAMETHOXAZOLE AND TRIMETHOPRIM 800; 160 MG/1; MG/1
1 TABLET ORAL 2 TIMES DAILY
Qty: 20 TAB | Refills: 0 | Status: SHIPPED | OUTPATIENT
Start: 2020-09-01 | End: 2020-09-11

## 2020-09-01 RX ORDER — DISULFIRAM 250 MG/1
250 TABLET ORAL DAILY
Qty: 90 TAB | Refills: 0 | Status: SHIPPED | OUTPATIENT
Start: 2020-09-01

## 2020-09-01 RX ORDER — TRAZODONE HYDROCHLORIDE 50 MG/1
50 TABLET ORAL
Qty: 30 TAB | Refills: 0 | Status: SHIPPED | OUTPATIENT
Start: 2020-09-01 | End: 2020-09-23 | Stop reason: SDUPTHER

## 2020-09-01 NOTE — TELEPHONE ENCOUNTER
Pt is calling requesting a refill on med    Requested Prescriptions     Pending Prescriptions Disp Refills    disulfiram (ANTABUSE) 250 mg tablet 90 Tab 0     Sig: Take 1 Tab by mouth daily.

## 2020-09-01 NOTE — PROGRESS NOTES
Chief Complaint   Patient presents with    Complete Physical    Labs     Patient is continuing to work as a , night shift. Patient reports that his drinking is better, but he does still drink no liquor, only beer. Patient is in counseling with his wife. Patient reports that disulfiram does not change his drinking. Patient started Zoloft, however discontinued due to sexual side effects and headaches. Patient is requesting STD testing, reports that he had a new sexual contact. Patient is also reporting occasional perineal pain, notices some pain when sitting down. Subjective:   Esa Preston is a 53 y.o. male presenting for his annual checkup. ROS:  Feeling well. No dyspnea or chest pain on exertion. No abdominal pain, change in bowel habits, black or bloody stools. No urinary tract or prostatic symptoms. No neurological complaints. Specific concerns today: see above. Patient Active Problem List   Diagnosis Code    Essential hypertension I10      Objective:     Visit Vitals  BP (!) 150/109 (BP 1 Location: Left arm, BP Patient Position: Sitting)   Pulse 89   Temp 97.6 °F (36.4 °C) (Temporal)   Resp 20   Ht 5' 8\" (1.727 m)   Wt 159 lb (72.1 kg)   SpO2 97%   BMI 24.18 kg/m²     The patient appears well, alert, oriented x 3, in no distress. ENT normal.  Neck supple. No adenopathy or thyromegaly. KISHORE. Lungs are clear, good air entry, no wheezes, rhonchi or rales. S1 and S2 normal, no murmurs, regular rate and rhythm. Abdomen is soft without tenderness, guarding, mass or organomegaly. Extremities show no edema, normal peripheral pulses. Neurological is normal without focal findings. Assessment/Plan:   healthy adult male  increase physical activity, routine labs ordered, call if any problems. ICD-10-CM ICD-9-CM    1.  Routine general medical examination at a health care facility  E11.18 V14.0 METABOLIC PANEL, COMPREHENSIVE      HEMOGLOBIN A1C WITH EAG      LIPID PANEL      CBC WITH AUTOMATED DIFF      TSH 3RD GENERATION      COLLECTION VENOUS BLOOD,VENIPUNCTURE      GA HANDLG&/OR CONVEY OF SPEC FOR TR OFFICE TO LAB   2. Screen for STD (sexually transmitted disease)  Z11.3 V74.5 HIV 1/2 AG/AB, 4TH GENERATION,W RFLX CONFIRM      RPR      HEPATITIS C AB      CHLAMYDIA/GC AMPLIFICATON THINPREP   3. Perineal pain  R10.2 [id number] PSA W/ REFLX FREE PSA   4. Acute stress reaction  F43.0 308.9 traZODone (DESYREL) 50 mg tablet     Encounter Diagnoses   Name Primary?  Routine general medical examination at a health care facility Yes     Orders Placed This Encounter    METABOLIC PANEL, COMPREHENSIVE    HEMOGLOBIN A1C WITH EAG    LIPID PANEL    CBC WITH AUTOMATED DIFF    TSH 3RD GENERATION    PSA W/ REFLX FREE PSA    HIV 1/2 AG/AB, 4TH GENERATION,W RFLX CONFIRM    RPR    HEPATITIS C AB    traZODone (DESYREL) 50 mg tablet    CHLAMYDIA/GC AMPLIFICATON THINPREP   .

## 2020-09-01 NOTE — PROGRESS NOTES
1. Have you been to the ER, urgent care clinic since your last visit? Hospitalized since your last visit? No    2. Have you seen or consulted any other health care providers outside of the [address] [place] [address] since your last visit? Include any pap smears or colon screening.  No     Health Maintenance Due   Topic Date Due    Pneumococcal 0-64 years (1 of 1 - PPSV23) 09/[birth year]/1979    Flu Vaccine (1) 09/01/2020

## 2020-09-04 LAB
ALBUMIN SERPL-MCNC: 4.8 G/DL (ref 4–5)
ALBUMIN/GLOB SERPL: 1.9 {RATIO} (ref 1.2–2.2)
ALP SERPL-CCNC: 73 IU/L (ref 39–117)
ALT SERPL-CCNC: 22 IU/L (ref 0–44)
AST SERPL-CCNC: 20 IU/L (ref 0–40)
BASOPHILS # BLD AUTO: 0 X10E3/UL (ref 0–0.2)
BASOPHILS NFR BLD AUTO: 1 %
BILIRUB SERPL-MCNC: 0.4 MG/DL (ref 0–1.2)
BUN SERPL-MCNC: 13 MG/DL (ref 6–24)
BUN/CREAT SERPL: 13 (ref 9–20)
C TRACH RRNA SPEC QL NAA+PROBE: NEGATIVE
CALCIUM SERPL-MCNC: 10.1 MG/DL (ref 8.7–10.2)
CHLORIDE SERPL-SCNC: 99 MMOL/L (ref 96–106)
CHOLEST SERPL-MCNC: 210 MG/DL (ref 100–199)
CO2 SERPL-SCNC: 25 MMOL/L (ref 20–29)
CREAT SERPL-MCNC: 1.03 MG/DL (ref 0.76–1.27)
EOSINOPHIL # BLD AUTO: 0.1 X10E3/UL (ref 0–0.4)
EOSINOPHIL NFR BLD AUTO: 1 %
ERYTHROCYTE [DISTWIDTH] IN BLOOD BY AUTOMATED COUNT: 12.5 % (ref 11.6–15.4)
EST. AVERAGE GLUCOSE BLD GHB EST-MCNC: 117 MG/DL
GLOBULIN SER CALC-MCNC: 2.5 G/DL (ref 1.5–4.5)
GLUCOSE SERPL-MCNC: 102 MG/DL (ref 65–99)
HBA1C MFR BLD: 5.7 % (ref 4.8–5.6)
HCT VFR BLD AUTO: 46.9 % (ref 37.5–51)
HCV AB S/CO SERPL IA: <0.1 S/CO RATIO (ref 0–0.9)
HDLC SERPL-MCNC: 67 MG/DL
HGB BLD-MCNC: 15.8 G/DL (ref 13–17.7)
HIV 1+2 AB+HIV1 P24 AG SERPL QL IA: NON REACTIVE
IMM GRANULOCYTES # BLD AUTO: 0 X10E3/UL (ref 0–0.1)
IMM GRANULOCYTES NFR BLD AUTO: 0 %
INTERPRETATION, 910389: NORMAL
LDLC SERPL CALC-MCNC: 128 MG/DL (ref 0–99)
LYMPHOCYTES # BLD AUTO: 1.8 X10E3/UL (ref 0.7–3.1)
LYMPHOCYTES NFR BLD AUTO: 36 %
MCH RBC QN AUTO: 28 PG (ref 26.6–33)
MCHC RBC AUTO-ENTMCNC: 33.7 G/DL (ref 31.5–35.7)
MCV RBC AUTO: 83 FL (ref 79–97)
MONOCYTES # BLD AUTO: 0.6 X10E3/UL (ref 0.1–0.9)
MONOCYTES NFR BLD AUTO: 11 %
N GONORRHOEA RRNA SPEC QL NAA+PROBE: NEGATIVE
NEUTROPHILS # BLD AUTO: 2.5 X10E3/UL (ref 1.4–7)
NEUTROPHILS NFR BLD AUTO: 51 %
PLATELET # BLD AUTO: 240 X10E3/UL (ref 150–450)
POTASSIUM SERPL-SCNC: 4 MMOL/L (ref 3.5–5.2)
PROT SERPL-MCNC: 7.3 G/DL (ref 6–8.5)
PSA SERPL-MCNC: 0.9 NG/ML (ref 0–4)
RBC # BLD AUTO: 5.64 X10E6/UL (ref 4.14–5.8)
REFLEX CRITERIA: NORMAL
RPR SER QL: NON REACTIVE
SODIUM SERPL-SCNC: 140 MMOL/L (ref 134–144)
TRIGL SERPL-MCNC: 85 MG/DL (ref 0–149)
TSH SERPL DL<=0.005 MIU/L-ACNC: 0.83 UIU/ML (ref 0.45–4.5)
VLDLC SERPL CALC-MCNC: 15 MG/DL (ref 5–40)
WBC # BLD AUTO: 5 X10E3/UL (ref 3.4–10.8)

## 2020-09-06 NOTE — PROGRESS NOTES
Cholesterol is elevated, please closely monitor diet and increase exercise, recheck in 6 months. Increase in risk for diabetes, please closely monitor diet and increase exercise   All other labs are within normal limits. A message has been sent in Medical Envelope and the lab work released to the patient.

## 2021-01-03 ENCOUNTER — HOSPITAL ENCOUNTER (EMERGENCY)
Age: 46
Discharge: HOME | End: 2021-01-03
Attending: EMERGENCY MEDICINE
Payer: COMMERCIAL

## 2021-01-03 ENCOUNTER — APPOINTMENT (OUTPATIENT)
Dept: CT IMAGING | Age: 46
End: 2021-01-03
Attending: EMERGENCY MEDICINE
Payer: COMMERCIAL

## 2021-01-03 VITALS
HEIGHT: 69 IN | OXYGEN SATURATION: 99 % | RESPIRATION RATE: 16 BRPM | WEIGHT: 157.85 LBS | TEMPERATURE: 99.1 F | DIASTOLIC BLOOD PRESSURE: 81 MMHG | HEART RATE: 79 BPM | BODY MASS INDEX: 23.38 KG/M2 | SYSTOLIC BLOOD PRESSURE: 111 MMHG

## 2021-01-03 DIAGNOSIS — K57.92 ACUTE DIVERTICULITIS: Primary | ICD-10-CM

## 2021-01-03 LAB
ALBUMIN SERPL-MCNC: 4 G/DL (ref 3.5–5)
ALBUMIN/GLOB SERPL: 1.1 {RATIO} (ref 1.1–2.2)
ALP SERPL-CCNC: 89 U/L (ref 45–117)
ALT SERPL-CCNC: 15 U/L (ref 12–78)
ANION GAP SERPL CALC-SCNC: 4 MMOL/L (ref 5–15)
AST SERPL-CCNC: 12 U/L (ref 15–37)
BASOPHILS # BLD: 0 K/UL (ref 0–0.1)
BASOPHILS NFR BLD: 0 % (ref 0–1)
BILIRUB SERPL-MCNC: 1 MG/DL (ref 0.2–1)
BUN SERPL-MCNC: 12 MG/DL (ref 6–20)
BUN/CREAT SERPL: 10 (ref 12–20)
CALCIUM SERPL-MCNC: 9.8 MG/DL (ref 8.5–10.1)
CHLORIDE SERPL-SCNC: 101 MMOL/L (ref 97–108)
CO2 SERPL-SCNC: 30 MMOL/L (ref 21–32)
CREAT SERPL-MCNC: 1.18 MG/DL (ref 0.7–1.3)
DIFFERENTIAL METHOD BLD: ABNORMAL
EOSINOPHIL # BLD: 0 K/UL (ref 0–0.4)
EOSINOPHIL NFR BLD: 0 % (ref 0–7)
ERYTHROCYTE [DISTWIDTH] IN BLOOD BY AUTOMATED COUNT: 13.1 % (ref 11.5–14.5)
GLOBULIN SER CALC-MCNC: 3.8 G/DL (ref 2–4)
GLUCOSE SERPL-MCNC: 99 MG/DL (ref 65–100)
HCT VFR BLD AUTO: 45.9 % (ref 36.6–50.3)
HGB BLD-MCNC: 14.6 G/DL (ref 12.1–17)
IMM GRANULOCYTES # BLD AUTO: 0 K/UL (ref 0–0.04)
IMM GRANULOCYTES NFR BLD AUTO: 0 % (ref 0–0.5)
LIPASE SERPL-CCNC: 80 U/L (ref 73–393)
LYMPHOCYTES # BLD: 1.2 K/UL (ref 0.8–3.5)
LYMPHOCYTES NFR BLD: 12 % (ref 12–49)
MCH RBC QN AUTO: 26.1 PG (ref 26–34)
MCHC RBC AUTO-ENTMCNC: 31.8 G/DL (ref 30–36.5)
MCV RBC AUTO: 82 FL (ref 80–99)
MONOCYTES # BLD: 0.9 K/UL (ref 0–1)
MONOCYTES NFR BLD: 9 % (ref 5–13)
NEUTS SEG # BLD: 7.9 K/UL (ref 1.8–8)
NEUTS SEG NFR BLD: 79 % (ref 32–75)
NRBC # BLD: 0 K/UL (ref 0–0.01)
NRBC BLD-RTO: 0 PER 100 WBC
PLATELET # BLD AUTO: 225 K/UL (ref 150–400)
PMV BLD AUTO: 10.2 FL (ref 8.9–12.9)
POTASSIUM SERPL-SCNC: 3.6 MMOL/L (ref 3.5–5.1)
PROT SERPL-MCNC: 7.8 G/DL (ref 6.4–8.2)
RBC # BLD AUTO: 5.6 M/UL (ref 4.1–5.7)
SODIUM SERPL-SCNC: 135 MMOL/L (ref 136–145)
WBC # BLD AUTO: 10.1 K/UL (ref 4.1–11.1)

## 2021-01-03 PROCEDURE — 83690 ASSAY OF LIPASE: CPT

## 2021-01-03 PROCEDURE — 85025 COMPLETE CBC W/AUTO DIFF WBC: CPT

## 2021-01-03 PROCEDURE — 80053 COMPREHEN METABOLIC PANEL: CPT

## 2021-01-03 PROCEDURE — 96374 THER/PROPH/DIAG INJ IV PUSH: CPT

## 2021-01-03 PROCEDURE — 74011250636 HC RX REV CODE- 250/636: Performed by: EMERGENCY MEDICINE

## 2021-01-03 PROCEDURE — 96376 TX/PRO/DX INJ SAME DRUG ADON: CPT

## 2021-01-03 PROCEDURE — 99284 EMERGENCY DEPT VISIT MOD MDM: CPT

## 2021-01-03 PROCEDURE — 74177 CT ABD & PELVIS W/CONTRAST: CPT

## 2021-01-03 PROCEDURE — 36415 COLL VENOUS BLD VENIPUNCTURE: CPT

## 2021-01-03 PROCEDURE — 74011250637 HC RX REV CODE- 250/637: Performed by: EMERGENCY MEDICINE

## 2021-01-03 PROCEDURE — 96375 TX/PRO/DX INJ NEW DRUG ADDON: CPT

## 2021-01-03 PROCEDURE — 74011000636 HC RX REV CODE- 636: Performed by: EMERGENCY MEDICINE

## 2021-01-03 RX ORDER — METRONIDAZOLE 250 MG/1
500 TABLET ORAL
Status: COMPLETED | OUTPATIENT
Start: 2021-01-03 | End: 2021-01-03

## 2021-01-03 RX ORDER — ONDANSETRON 2 MG/ML
4 INJECTION INTRAMUSCULAR; INTRAVENOUS
Status: COMPLETED | OUTPATIENT
Start: 2021-01-03 | End: 2021-01-03

## 2021-01-03 RX ORDER — OXYCODONE AND ACETAMINOPHEN 5; 325 MG/1; MG/1
1 TABLET ORAL
Qty: 12 TAB | Refills: 0 | Status: SHIPPED | OUTPATIENT
Start: 2021-01-03 | End: 2021-01-06

## 2021-01-03 RX ORDER — CIPROFLOXACIN 500 MG/1
500 TABLET ORAL
Status: COMPLETED | OUTPATIENT
Start: 2021-01-03 | End: 2021-01-03

## 2021-01-03 RX ORDER — CIPROFLOXACIN 500 MG/1
500 TABLET ORAL 2 TIMES DAILY
Qty: 20 TAB | Refills: 0 | Status: SHIPPED | OUTPATIENT
Start: 2021-01-03 | End: 2021-01-13

## 2021-01-03 RX ORDER — METRONIDAZOLE 500 MG/1
500 TABLET ORAL 2 TIMES DAILY
Qty: 20 TAB | Refills: 0 | Status: SHIPPED | OUTPATIENT
Start: 2021-01-03 | End: 2021-01-13

## 2021-01-03 RX ORDER — FENTANYL CITRATE 50 UG/ML
75 INJECTION, SOLUTION INTRAMUSCULAR; INTRAVENOUS
Status: COMPLETED | OUTPATIENT
Start: 2021-01-03 | End: 2021-01-03

## 2021-01-03 RX ORDER — FENTANYL CITRATE 50 UG/ML
50 INJECTION, SOLUTION INTRAMUSCULAR; INTRAVENOUS
Status: COMPLETED | OUTPATIENT
Start: 2021-01-03 | End: 2021-01-03

## 2021-01-03 RX ADMIN — FENTANYL CITRATE 50 MCG: 50 INJECTION, SOLUTION INTRAMUSCULAR; INTRAVENOUS at 19:04

## 2021-01-03 RX ADMIN — SODIUM CHLORIDE 1000 ML: 9 INJECTION, SOLUTION INTRAVENOUS at 19:04

## 2021-01-03 RX ADMIN — FENTANYL CITRATE 75 MCG: 50 INJECTION, SOLUTION INTRAMUSCULAR; INTRAVENOUS at 21:35

## 2021-01-03 RX ADMIN — IOPAMIDOL 100 ML: 755 INJECTION, SOLUTION INTRAVENOUS at 19:23

## 2021-01-03 RX ADMIN — CIPROFLOXACIN 500 MG: 500 TABLET, FILM COATED ORAL at 23:50

## 2021-01-03 RX ADMIN — METRONIDAZOLE 500 MG: 250 TABLET ORAL at 23:50

## 2021-01-03 RX ADMIN — ONDANSETRON 4 MG: 2 INJECTION INTRAMUSCULAR; INTRAVENOUS at 19:04

## 2021-01-03 NOTE — ED NOTES
Pt presents to ED with c/o of left lower quadrant abdominal pain and back pain  for 3 days. Pt is placed on the monitor x 2, in position of comfort, wife at the bedside and call bell within reach.

## 2021-01-03 NOTE — Clinical Note
[address] 
[facility] EMERGENCY DEPT 
[address] [facility] [id number] [phone] Work/School Note Date: 1/3/2021 To Whom It May concern: 
 
Gillian Moulton was seen and treated today in the emergency room by the following provider(s): 
Attending Provider: Laura Craig MD.   
 
Gillian Moulton is excused from work/school on 1/3/2021 through 1/6/2021. He is medically clear to return to work/school on 1/7/2021. Sincerely, Adrian Hernandez MD

## 2021-01-03 NOTE — LETTER
[address] 
[facility] EMERGENCY DEPT 
[address] [place] [address] [place] [id number] [phone] Work/School Note Date: 1/3/2021 To Whom It May concern: 
 
Shawn Shanae. was seen and treated today in the emergency room by the following provider(s): 
Attending Provider: Yasmine Spencer MD.   
 
Shawn Carbone may return to work on 1/10/2021. Sincerely, Terese Glass MD

## 2021-01-04 NOTE — ED PROVIDER NOTES
EMERGENCY DEPARTMENT HISTORY AND PHYSICAL EXAM      Date: 1/3/2021  Patient Name: Francois Kaufman. History of Presenting Illness     Chief Complaint   Patient presents with    Abdominal Pain     left lower abdominal pain for several days, referred by [facility] for CT       History Provided By: Patient    HPI: Francois Kaufman., 50 y.o. male with a past medical history significant for No significant past medical history presents to the ED with cc of moderate severe left lower quadrant pain over the last several days. Patient reports pain for 4 days that is localized and worse with movement. He reports mild nausea but no vomiting, diarrhea, constipation, or hematochezia. No urinary complaints. [facility] and [facility] to the ER for further evaluation. He denies any fevers or chills. No other associated symptoms. No other exacerbating ameliorating factors. There are no other complaints, changes, or physical findings at this time. PCP: Jasper Staples MD    No current facility-administered medications on file prior to encounter. Current Outpatient Medications on File Prior to Encounter   Medication Sig Dispense Refill    traZODone (DESYREL) 50 mg tablet Take 1 Tab by mouth nightly. 90 Tab 1    disulfiram (ANTABUSE) 250 mg tablet Take 1 Tab by mouth daily. 90 Tab 0    amLODIPine (NORVASC) 10 mg tablet Take 1 Tab by mouth daily. 90 Tab 3    nitroglycerin (NITROSTAT) 0.4 mg SL tablet Take 1 Tab by mouth every five (5) minutes as needed for Chest Pain. Sit/Lay down then put one tab under the tongue every 5 minutes as needed for chest pain for 3 doses 1 Bottle 0       Past History     Past Medical History:  No past medical history on file.     Past Surgical History:  Past Surgical History:   Procedure Laterality Date    HX ORTHOPAEDIC      scoliosis surgery       Family History:  Family History   Problem Relation Age of Onset    Diabetes Mother     Heart Disease Mother     Hypertension Mother     Heart Attack Mother     Diabetes Father     Hypertension Father     Pancreatic Cancer Paternal Grandmother        Social History:  Social History     Tobacco Use    Smoking status: Former Smoker     Types: Cigarettes     Quit date: 1996     Years since quittin.6    Smokeless tobacco: Current User     Types: Snuff   Substance Use Topics    Alcohol use: Not Currently     Alcohol/week: 4.0 standard drinks     Types: 4 Cans of beer per week     Comment: 4-5 BEERS NIGHTLY    Drug use: No       Allergies: Allergies   Allergen Reactions    Lisinopril Palpitations         Review of Systems   Review of Systems   Constitutional: Negative for chills, diaphoresis, fatigue and fever. HENT: Negative for ear pain and sore throat. Eyes: Negative for pain and redness. Respiratory: Negative for cough and shortness of breath. Cardiovascular: Negative for chest pain and leg swelling. Gastrointestinal: Positive for abdominal pain. Negative for diarrhea, nausea and vomiting. Endocrine: Negative for cold intolerance and heat intolerance. Genitourinary: Negative for flank pain and hematuria. Musculoskeletal: Negative for back pain and neck stiffness. Skin: Negative for rash and wound. Neurological: Negative for dizziness, syncope and headaches. All other systems reviewed and are negative. Physical Exam   Physical Exam  Vitals signs and nursing note reviewed. Constitutional:       Appearance: He is well-developed. HENT:      Head: Normocephalic and atraumatic. Mouth/Throat:      Pharynx: No oropharyngeal exudate. Eyes:      Conjunctiva/sclera: Conjunctivae normal.      Pupils: Pupils are equal, round, and reactive to light. Neck:      Musculoskeletal: Normal range of motion. Cardiovascular:      Rate and Rhythm: Normal rate and regular rhythm. Heart sounds: No murmur.    Pulmonary:      Effort: Pulmonary effort is normal. No respiratory distress. Breath sounds: Normal breath sounds. No wheezing. Abdominal:      General: Bowel sounds are normal. There is no distension. Palpations: Abdomen is soft. Tenderness: There is abdominal tenderness in the left lower quadrant. There is guarding. There is no right CVA tenderness, left CVA tenderness or rebound. Musculoskeletal: Normal range of motion. General: No deformity. Skin:     General: Skin is warm and dry. Findings: No rash. Neurological:      Mental Status: He is alert and oriented to person, place, and time. Coordination: Coordination normal.   Psychiatric:         Behavior: Behavior normal.         Diagnostic Study Results     Labs -     Recent Results (from the past 24 hour(s))   CBC WITH AUTOMATED DIFF    Collection Time: 01/03/21  6:21 PM   Result Value Ref Range    WBC 10.1 4.1 - 11.1 K/uL    RBC 5.60 4.10 - 5.70 M/uL    HGB 14.6 12.1 - 17.0 g/dL    HCT 45.9 36.6 - 50.3 %    MCV 82.0 80.0 - 99.0 FL    MCH 26.1 26.0 - 34.0 PG    MCHC 31.8 30.0 - 36.5 g/dL    RDW 13.1 11.5 - 14.5 %    PLATELET 900 285 - 959 K/uL    MPV 10.2 8.9 - 12.9 FL    NRBC 0.0 0  WBC    ABSOLUTE NRBC 0.00 0.00 - 0.01 K/uL    NEUTROPHILS 79 (H) 32 - 75 %    LYMPHOCYTES 12 12 - 49 %    MONOCYTES 9 5 - 13 %    EOSINOPHILS 0 0 - 7 %    BASOPHILS 0 0 - 1 %    IMMATURE GRANULOCYTES 0 0.0 - 0.5 %    ABS. NEUTROPHILS 7.9 1.8 - 8.0 K/UL    ABS. LYMPHOCYTES 1.2 0.8 - 3.5 K/UL    ABS. MONOCYTES 0.9 0.0 - 1.0 K/UL    ABS. EOSINOPHILS 0.0 0.0 - 0.4 K/UL    ABS. BASOPHILS 0.0 0.0 - 0.1 K/UL    ABS. IMM.  GRANS. 0.0 0.00 - 0.04 K/UL    DF AUTOMATED     METABOLIC PANEL, COMPREHENSIVE    Collection Time: 01/03/21  6:21 PM   Result Value Ref Range    Sodium 135 (L) 136 - 145 mmol/L    Potassium 3.6 3.5 - 5.1 mmol/L    Chloride 101 97 - 108 mmol/L    CO2 30 21 - 32 mmol/L    Anion gap 4 (L) 5 - 15 mmol/L    Glucose 99 65 - 100 mg/dL    BUN 12 6 - 20 MG/DL    Creatinine 1.18 0.70 - 1.30 MG/DL    BUN/Creatinine ratio 10 (L) 12 - 20      GFR est AA >60 >60 ml/min/1.73m2    GFR est non-AA >60 >60 ml/min/1.73m2    Calcium 9.8 8.5 - 10.1 MG/DL    Bilirubin, total 1.0 0.2 - 1.0 MG/DL    ALT (SGPT) 15 12 - 78 U/L    AST (SGOT) 12 (L) 15 - 37 U/L    Alk. phosphatase 89 45 - 117 U/L    Protein, total 7.8 6.4 - 8.2 g/dL    Albumin 4.0 3.5 - 5.0 g/dL    Globulin 3.8 2.0 - 4.0 g/dL    A-G Ratio 1.1 1.1 - 2.2     LIPASE    Collection Time: 01/03/21  6:21 PM   Result Value Ref Range    Lipase 80 73 - 393 U/L       Radiologic Studies -   CT ABD PELV W CONT   Final Result   IMPRESSION:   Markedly mural thickening and pericolonic inflammatory changes at junction of   descending colon and sigmoid colon without discrete collection. Considerations   include diverticulitis and colonic mass lesion with perforation. CT Results  (Last 48 hours)               01/03/21 1945  CT ABD PELV W CONT Final result    Impression:  IMPRESSION:   Markedly mural thickening and pericolonic inflammatory changes at junction of   descending colon and sigmoid colon without discrete collection. Considerations   include diverticulitis and colonic mass lesion with perforation. Narrative:  EXAM: CT ABD PELV W CONT       INDICATION: LLQ abd pain       COMPARISON: Ultrasound 1/13/2016        CONTRAST: 100 mL of Isovue-370. TECHNIQUE:    Following the uneventful intravenous administration of contrast, thin axial   images were obtained through the abdomen and pelvis. Coronal and sagittal   reconstructions were generated. Oral contrast was not administered. CT dose   reduction was achieved through use of a standardized protocol tailored for this   examination and automatic exposure control for dose modulation. The lack of oral   contrast material diminishes the capacity of CT to evaluate the bowel and   adjacent structures.        FINDINGS:    LOWER THORAX: No significant abnormality in the incidentally imaged lower chest.   LIVER: No mass. BILIARY TREE: Gallbladder is within normal limits. CBD is not dilated. SPLEEN: within normal limits. PANCREAS: No mass or ductal dilatation. ADRENALS: Unremarkable. KIDNEYS: No mass, calculus, or hydronephrosis. STOMACH: Unremarkable. SMALL BOWEL: No dilatation or wall thickening. COLON: Abundant pericolonic inflammatory changes at the junction of the   descending colon and sigmoid colon with markedly mural thickening and   pericolonic fat stranding with poor delineation of the adventitial mural margins   concerning for phlegmon. A discrete collection is not shown. APPENDIX: Unremarkable. PERITONEUM: No ascites or pneumoperitoneum. RETROPERITONEUM: No lymphadenopathy or aortic aneurysm. REPRODUCTIVE ORGANS: Those shown appear within normal limits. URINARY BLADDER: No mass or calculus. BONES: Posterior spinal fixation to the L1 level. ABDOMINAL WALL: No mass or hernia. ADDITIONAL COMMENTS: N/A               CXR Results  (Last 48 hours)    None            Medical Decision Making   I am the first provider for this patient. I reviewed the vital signs, available nursing notes, past medical history, past surgical history, family history and social history. Vital Signs-Reviewed the patient's vital signs. Patient Vitals for the past 12 hrs:   Temp Pulse Resp BP SpO2   01/03/21 2145    118/82 95 %   01/03/21 1930 99.1 °F (37.3 °C) 79 16 119/80 99 %   01/03/21 1812 98.4 °F (36.9 °C) 83 16 125/83    01/03/21 1751 98.5 °F (36.9 °C) (!) 101 16 (!) 139/96 98 %       Records Reviewed: Nursing records and medical records reviewed    MDM:  Pt presents with acute abdominal pain; vital signs stable with currently a non-peritoneal exam; DDx includes: Gastroenteritis, hepatitis, pancreatitis, obstruction, appendicitis, viral illness, IBD, diverticulitis, mesenteric ischemia, AAA or descending dissection, ACS, kidney stone.  Will get labs, treat symptomatically and obtain serial abdominal exams to determine if a CT is warranted. Will reassess and monitor closely. Provider Notes (Medical Decision Making):   Patient a 18-year-old male presenting with findings consistent with acute diverticulitis. CT scan findings are consistent with this. They did report that it is possible that underlying mass with localized perforation could be present. However, my exam and history of strongly favors acute diverticulitis. Offered admission given his significant pain however he declined he would like to try liquid diet and antibiotic therapy and symptom management. I think this is reasonable. He will follow-up in the next week for reassessment with his primary care doctor and also with GI for colonoscopy in several weeks. ED Course:   Initial assessment performed. The patients presenting problems have been discussed, and they are in agreement with the care plan formulated and outlined with them. I have encouraged them to ask questions as they arise throughout their visit.          Medications Administered     fentaNYL citrate (PF) injection 50 mcg     Admin Date  01/03/2021 Action  Given Dose  50 mcg Route  IntraVENous Administered By  Merlin Hdz RN          fentaNYL citrate (PF) injection 75 mcg     Admin Date  01/03/2021 Action  Given Dose  75 mcg Route  IntraVENous Administered By  Cl Spangler RN          iopamidoL (ISOVUE-370) 76 % injection 100 mL     Admin Date  01/03/2021 Action  Given Dose  100 mL Route  IntraVENous Administered By  Joe Parkinson          ondansetron [facility]) injection 4 mg     Admin Date  01/03/2021 Action  Given Dose  4 mg Route  IntraVENous Administered By  Merlin Hdz RN          sodium chloride 0.9 % bolus infusion 1,000 mL     Admin Date  01/03/2021 Action  New Bag Dose  1,000 mL Route  IntraVENous Administered By  Merlin Hdz RN                    Critical Care:  None      Disposition:  11:13 PM  Alisa Hill Jr.'s  results have been reviewed with him. He has been counseled regarding his diagnosis. He verbally conveys understanding and agreement of the signs, symptoms, diagnosis, treatment and prognosis and additionally agrees to follow up as recommended with Dr. Rachael Flores MD in 24 - 48 hours. He also agrees with the care-plan and conveys that all of his questions have been answered. I have also put together some discharge instructions for him that include: 1) educational information regarding their diagnosis, 2) how to care for their diagnosis at home, as well a 3) list of reasons why they would want to return to the ED prior to their follow-up appointment, should their condition change. DISCHARGE PLAN:  1. Current Discharge Medication List      START taking these medications    Details   ciprofloxacin HCl (Cipro) 500 mg tablet Take 1 Tab by mouth two (2) times a day for 10 days. Qty: 20 Tab, Refills: 0      metroNIDAZOLE (FlagyL) 500 mg tablet Take 1 Tab by mouth two (2) times a day for 10 days. Qty: 20 Tab, Refills: 0      oxyCODONE-acetaminophen (Percocet) 5-325 mg per tablet Take 1 Tab by mouth every six (6) hours as needed for Pain for up to 3 days. Max Daily Amount: 4 Tabs. Qty: 12 Tab, Refills: 0    Associated Diagnoses: Acute diverticulitis           2. Follow-up Information     Follow up With Specialties Details Why Contact Info    Leah Staples MD Family Medicine In 1 week For a follow-up evaluation. [address]  [address] [facility]      Josh Delacruz MD Gastroenterology In 1 week This is a GI physician you can see for further evaluation of your diverticulitis. You will need to have a colonoscopy performed [address]  [phone]          3. Return to ED if worse     Diagnosis     Clinical Impression:   1.  Acute diverticulitis        Attestations:    Alessio Ramos MD    Please note that this dictation was completed with [employer], the [employer] voice recognition software. Quite often unanticipated grammatical, syntax, homophones, and other interpretive errors are inadvertently transcribed by the computer software. Please disregard these errors. Please excuse any errors that have escaped final proofreading. Thank you.

## 2021-01-04 NOTE — ED NOTES
Bedside shift change report given to [address] (oncoming nurse) by Alka Lilly RN (offgoing nurse). Report included the following information SBAR, ED Summary, MAR and Accordion.

## 2021-01-04 NOTE — ED NOTES
Terese Peacock MD reviewed discharge instructions with the patient. The patient verbalized understanding. All questions and concerns were addressed. The patient declined a wheelchair and is discharged ambulatory in the care of family members with instructions and prescriptions in hand. Pt is alert and oriented x 4. Respirations are clear and unlabored.

## 2021-01-05 ENCOUNTER — TRANSCRIBE ORDER (OUTPATIENT)
Dept: SCHEDULING | Age: 46
End: 2021-01-05

## 2021-01-05 ENCOUNTER — OFFICE VISIT (OUTPATIENT)
Dept: SURGERY | Age: 46
End: 2021-01-05
Payer: COMMERCIAL

## 2021-01-05 VITALS
BODY MASS INDEX: 24.17 KG/M2 | OXYGEN SATURATION: 99 % | HEART RATE: 86 BPM | TEMPERATURE: 97.5 F | WEIGHT: 163.7 LBS | DIASTOLIC BLOOD PRESSURE: 72 MMHG | SYSTOLIC BLOOD PRESSURE: 110 MMHG

## 2021-01-05 DIAGNOSIS — R10.32 LEFT LOWER QUADRANT PAIN: Primary | ICD-10-CM

## 2021-01-05 DIAGNOSIS — R93.89 ABNORMAL CT SCAN: ICD-10-CM

## 2021-01-05 DIAGNOSIS — K57.92 DIVERTICULITIS OF INTESTINE: ICD-10-CM

## 2021-01-05 DIAGNOSIS — K57.32 DIVERTICULITIS OF COLON: Primary | ICD-10-CM

## 2021-01-05 PROCEDURE — 99243 OFF/OP CNSLTJ NEW/EST LOW 30: CPT | Performed by: SURGERY

## 2021-01-05 NOTE — PROGRESS NOTES
Identified pt with two pt identifiers(name and ). Reviewed record in preparation for visit and have obtained necessary documentation. All patient medications has been reviewed. Chief Complaint   Patient presents with    Abdominal Pain     seen at thr request of DR HALLIE MOSER [facility] for eval of diverticulitis       Health Maintenance Due   Topic    Pneumococcal 0-64 years (1 of 1 - PPSV23)    Flu Vaccine (1)       Vitals:    21 1603   BP: 110/72   Pulse: 86   Temp: 97.5 °F (36.4 °C)   TempSrc: Temporal   SpO2: 99%   Weight: 74.3 kg (163 lb 11.2 oz)   PainSc:   3   PainLoc: Abdomen       4. Have you been to the ER, urgent care clinic since your last visit? Hospitalized since your last visit? No    5. Have you seen or consulted any other health care providers outside of the [address] [place] [address] since your last visit? Include any pap smears or colon screening. No      Patient is accompanied by self I have received verbal consent from Elizabeth Mcfarlane. to discuss any/all medical information while they are present in the room.

## 2021-01-05 NOTE — PATIENT INSTRUCTIONS
Full Liquid Diet: Any type of fluids. Soups. Custard, pudding, yogurt, cottage cheese, creamy peanut butter. Plain ice cream, frozen yogurt, and sherbet. Have 4-5 nutritional supplements per day -- Boost, Ensure, or [employer].   
 
----------------------------------------------------------------------------------------------------------------------------------- Low-Residue Diet: 
 
When your bowels are inflamed, you need to limit bulky fiber (such as uncooked fruits and vegetables) in your diet until the the inflammation resolves. You should maintain a low-residue diet for one month. Suggestions: 
-Choose white or refined grains, and avoid whole grains. That means eating white or refined cereals, breads, crackers, rice, or pasta. 
-Peel the skin from fruits and vegetables before you eat or cook them. Avoid eating skins, seeds, and hulls. -You can eat frozen or canned fruit. Low-fiber fruits include applesauce, ripe bananas, and fruit juice without pulp. 
-Eat low-fiber vegetables, which include well-cooked vegetables and vegetable juice. 
-Drink or eat milk, yogurt, or other milk products, if you can digest dairy without too many problems.  
-Eat well-cooked meat, such as chicken, turkey, fish, or lean meat. You also can eat eggs and tofu. Avoid these foods: 
-Nuts, seeds, popcorn, granola, whole grains.   
-Bran, brown or wild rice, oatmeal, corn, nevaeh crackers, barley, and whole wheat and other whole grain breads. 
-Cereals with more than 3 grams of fiber per serving. 
-Fresh and dried fruits including raisins.   
-Raw vegetables, and even some cooked vegetable including cabbage, broccoli, brussels sprouts, and cauliflower. 
-Beans, lentils, and split peas. -Crunchy peanut butter.

## 2021-01-26 ENCOUNTER — TRANSCRIBE ORDER (OUTPATIENT)
Dept: SCHEDULING | Age: 46
End: 2021-01-26

## 2021-01-26 DIAGNOSIS — K92.1 HEMATOCHEZIA: ICD-10-CM

## 2021-01-26 DIAGNOSIS — R10.32 LEFT LOWER QUADRANT PAIN: ICD-10-CM

## 2021-01-26 DIAGNOSIS — K57.92 DIVERTICULITIS: Primary | ICD-10-CM

## 2021-01-26 DIAGNOSIS — R19.5 LOOSE STOOLS: ICD-10-CM

## 2021-01-27 NOTE — PROGRESS NOTES
To: Corrinne Leatherwood, MD  CC:  Good Staples MD       From: Yamileth Fernández MD    Thank you for sending Yosef Almaz to see us. Please note that this dictation was completed with [employer], the computer voice recognition software. Quite often unanticipated grammatical, syntax, homophones, and other interpretive errors are inadvertently transcribed by the software. Please disregard these errors. Please excuse any errors that have escaped final proofreading. Encounter Date: 1/5/2021  History and Physical    Assessment:   Diverticulitis. Significant sigmoid colon inflammation but no evidence of perforation and he is not obstipated. He is on Cipro and Flagyl. Body mass index is 24.17 kg/m². No significant comorbidities. No prior abdominal operations. Plan/Recommendations/Medical Decision Making: We reviewed the CAT scan images together. The anatomy and pathophysiology were explained. I instructed him to complete his antibiotic course. I also instructed him to remain on a full liquid diet until the left lower quadrant pain has resolved entirely. He should then stay on a low residue diet until the caliber of his stools has normalized. He should also continue to use MiraLAX at least once daily. We discussed the fact that he will likely need a colonoscopy in the future. He has never had one before. HPI:   Patient is a 50 y.o. male who is seen in consultation at the request of Dr. Maria Elnea Roe for acute diverticulitis. The patient says that about a week ago he was out hunting and developed pain in his left lower quadrant. He thought it was secondary to lifting the deer but the pain did not go away and in fact got worse. His wife is a nurse and was concerned that the pain did not let up and had him go to the ER. He went to the ER at [facility] and underwent a CAT scan that showed focal but markedly thickened colon at the junction of the descending and sigmoid.     He tells me that he is still having pain. He has not been having any fevers but did have fevers in the emergency department. He is passing gas and admits to essentially ribbon stools. This is his first ever attack of diverticulitis. Past Medical History:   Diagnosis Date    Diverticulitis 2021      Past Surgical History:   Procedure Laterality Date    HX ORTHOPAEDIC      scoliosis surgery     Social History     Tobacco Use    Smoking status: Former Smoker     Types: Cigarettes     Quit date: 1996     Years since quittin.7    Smokeless tobacco: Current User     Types: Snuff   Substance Use Topics    Alcohol use: Not Currently     Alcohol/week: 4.0 standard drinks     Types: 4 Cans of beer per week     Comment: 4-5 BEERS NIGHTLY      Family History   Problem Relation Age of Onset    Diabetes Mother     Heart Disease Mother     Hypertension Mother     Heart Attack Mother     Diabetes Father     Hypertension Father     Pancreatic Cancer Paternal Grandmother        Current Outpatient Medications   Medication Sig    traZODone (DESYREL) 50 mg tablet Take 1 Tab by mouth nightly.  disulfiram (ANTABUSE) 250 mg tablet Take 1 Tab by mouth daily.  amLODIPine (NORVASC) 10 mg tablet Take 1 Tab by mouth daily.  nitroglycerin (NITROSTAT) 0.4 mg SL tablet Take 1 Tab by mouth every five (5) minutes as needed for Chest Pain. Sit/Lay down then put one tab under the tongue every 5 minutes as needed for chest pain for 3 doses     No current facility-administered medications for this visit. Allergies: Allergies   Allergen Reactions    Lisinopril Palpitations        Review of Systems:  10 systems reviewed. See scanned sheet in \"Media\" section. See HPI for pertinent positives and negatives.       Objective:     Visit Vitals  /72 (BP 1 Location: Right arm, BP Patient Position: Sitting)   Pulse 86   Temp 97.5 °F (36.4 °C) (Temporal)   Wt 74.3 kg (163 lb 11.2 oz)   SpO2 99%   BMI 24.17 kg/m² General appearance  Alert, cooperative, no distress, appears stated age   [de-identified]  Anicteric   Neck Supple   Back   No CVA tenderness   Lungs   CTAB   Heart  Regular rate and rhythm. No murmur, rub or gallop   Abdomen   Soft. Bowel sounds normal. No palpable masses. Tender to palpation in the left lower quadrant only. There is no rebound or guarding. Extremities No CCE. Pulses 2+ right radial   Skin Skin color, texture, turgor normal.    Lymph nodes No palpable lymphadenopathy.    Neurologic Without overt sensory or motor deficit       Signed By: Arely Payne MD     January 26, 2021

## 2021-02-03 DIAGNOSIS — F43.0 ACUTE STRESS REACTION: ICD-10-CM

## 2021-02-04 RX ORDER — TRAZODONE HYDROCHLORIDE 50 MG/1
50 TABLET ORAL
Qty: 90 TAB | Refills: 1 | Status: SHIPPED | OUTPATIENT
Start: 2021-02-04 | End: 2021-11-01 | Stop reason: SDUPTHER

## 2021-02-05 ENCOUNTER — HOSPITAL ENCOUNTER (OUTPATIENT)
Dept: CT IMAGING | Age: 46
Discharge: HOME | End: 2021-02-05
Attending: SPECIALIST
Payer: COMMERCIAL

## 2021-02-05 DIAGNOSIS — K57.92 DIVERTICULITIS OF INTESTINE: ICD-10-CM

## 2021-02-05 DIAGNOSIS — R10.32 LEFT LOWER QUADRANT PAIN: ICD-10-CM

## 2021-02-05 DIAGNOSIS — R93.89 ABNORMAL CT SCAN: ICD-10-CM

## 2021-02-05 PROCEDURE — 74178 CT ABD&PLV WO CNTR FLWD CNTR: CPT | Performed by: SPECIALIST

## 2021-02-05 RX ORDER — SODIUM CHLORIDE 0.9 % (FLUSH) 0.9 %
10 SYRINGE (ML) INJECTION
Status: COMPLETED | OUTPATIENT
Start: 2021-02-05 | End: 2021-02-05

## 2021-02-05 RX ORDER — BARIUM SULFATE 20 MG/ML
900 SUSPENSION ORAL
Status: COMPLETED | OUTPATIENT
Start: 2021-02-05 | End: 2021-02-05

## 2021-02-05 RX ADMIN — BARIUM SULFATE 900 ML: 20 SUSPENSION ORAL at 15:33

## 2021-02-05 RX ADMIN — Medication 10 ML: at 15:33

## 2022-02-17 ENCOUNTER — VIRTUAL VISIT (OUTPATIENT)
Dept: FAMILY MEDICINE CLINIC | Age: 47
End: 2022-02-17
Payer: COMMERCIAL

## 2022-02-17 DIAGNOSIS — K57.92 DIVERTICULITIS: Primary | ICD-10-CM

## 2022-02-17 PROCEDURE — 99443 PR PHYS/QHP TELEPHONE EVALUATION 21-30 MIN: CPT | Performed by: FAMILY MEDICINE

## 2022-02-17 RX ORDER — CIPROFLOXACIN 500 MG/1
500 TABLET ORAL 2 TIMES DAILY
Qty: 20 TABLET | Refills: 0 | Status: SHIPPED | OUTPATIENT
Start: 2022-02-17 | End: 2022-02-27

## 2022-02-17 RX ORDER — INDOMETHACIN 50 MG/1
50 CAPSULE ORAL 3 TIMES DAILY
Qty: 30 CAPSULE | Refills: 0 | Status: SHIPPED | OUTPATIENT
Start: 2022-02-17 | End: 2022-02-27

## 2022-02-17 RX ORDER — METRONIDAZOLE 500 MG/1
500 TABLET ORAL 3 TIMES DAILY
Qty: 30 TABLET | Refills: 0 | Status: SHIPPED | OUTPATIENT
Start: 2022-02-17 | End: 2022-02-27

## 2022-02-17 NOTE — PROGRESS NOTES
Cindy Avendano is a 48 y.o. male evaluated via audio only technology on 2/17/2022. Consent: He and/or his health care decision maker is aware that he may receive a bill for this audio only encounter, depending on his insurance coverage, and has provided verbal consent to proceed: Yes    I communicated with the patient and/or health care decision maker about the nature and details of the following:  Assessment & Plan:   Diverticulitis  Presumed based on symptoms. Second lifetime bout. Very similar symptoms to his last episode 1 year ago although not as severe  Recommend low residual diet and/or MiraLAX  Cipro, Flagyl  Indomethacin for pain    At his age I suggested that if he has another bout within the next year consider seeing surgeon    12  Subjective:   Cindy Avendano is a 48 y.o. male who was seen for Abdominal Pain    Presents with left lower quadrant abdominal pain reminiscent of previous bout of diverticulitis. Had a dramatic left lower quadrant abdominal pain 1 year ago that [facility] and in [facility] emergency room. CT confirmed diverticulitis. Had a colonoscopy since then that he reports was unrevealing.    5 days ago had a gradual onset of left lower quadrant abdominal pain and pencil thin stools. .  Crampy abdominal pain preceding a bowel movement. Denies fever. Has been able to go to work and in fact just got off of work    Prior to Admission medications    Medication Sig Start Date End Date Taking? Authorizing Provider   ciprofloxacin HCl (CIPRO) 500 mg tablet Take 1 Tablet by mouth two (2) times a day for 10 days. 2/17/22 2/27/22 Yes Ivania Thomas MD   metroNIDAZOLE (FLAGYL) 500 mg tablet Take 1 Tablet by mouth three (3) times daily for 10 days. 2/17/22 2/27/22 Yes Ivania Thomas MD   indomethacin (INDOCIN) 50 mg capsule Take 1 Capsule by mouth three (3) times daily for 10 days.  2/17/22 2/27/22 Yes Ivania Thomas MD   traZODone (DESYREL) 50 mg tablet Take 1 Tablet by mouth nightly. 11/2/21  Yes Macey Staples MD   disulfiram (ANTABUSE) 250 mg tablet Take 1 Tab by mouth daily. Patient not taking: Reported on 2/17/2022 9/1/20   Macey Staples MD   amLODIPine (NORVASC) 10 mg tablet Take 1 Tab by mouth daily. Patient not taking: Reported on 2/17/2022 6/18/19   Macey Staples MD   nitroglycerin (NITROSTAT) 0.4 mg SL tablet Take 1 Tab by mouth every five (5) minutes as needed for Chest Pain. Sit/Lay down then put one tab under the tongue every 5 minutes as needed for chest pain for 3 doses  Patient not taking: Reported on 2/17/2022 4/16/19   Lexi Hay MD     Allergies   Allergen Reactions    Lisinopril Palpitations         I affirm this is a Patient-Initiated Episode with a Patient who has not had a related appointment within my department in the past 7 days or scheduled within the next 24 hours.     Total Time: minutes: 21-30 minutes    Note: not billable if this call serves to triage the patient into an appointment for the relevant concern      Kevin Kim MD

## 2022-02-17 NOTE — PROGRESS NOTES
Health Maintenance Due   Topic Date Due    COVID-19 Vaccine (1) Never done    Colorectal Cancer Screening Combo  Never done    Flu Vaccine (1) Never done    Depression Screen  09/01/2021

## 2022-02-23 DIAGNOSIS — K57.92 DIVERTICULITIS: ICD-10-CM

## 2022-02-23 DIAGNOSIS — R10.32 LEFT LOWER QUADRANT ABDOMINAL PAIN: Primary | ICD-10-CM

## 2022-02-23 RX ORDER — ONDANSETRON 8 MG/1
8 TABLET, ORALLY DISINTEGRATING ORAL
Qty: 21 TABLET | Refills: 1 | Status: SHIPPED | OUTPATIENT
Start: 2022-02-23

## 2022-02-25 ENCOUNTER — TELEPHONE (OUTPATIENT)
Dept: FAMILY MEDICINE CLINIC | Age: 47
End: 2022-02-25

## 2022-02-25 ENCOUNTER — HOSPITAL ENCOUNTER (OUTPATIENT)
Dept: CT IMAGING | Age: 47
Discharge: HOME | End: 2022-02-25
Payer: COMMERCIAL

## 2022-02-25 DIAGNOSIS — K57.92 DIVERTICULITIS: ICD-10-CM

## 2022-02-25 DIAGNOSIS — R10.32 LEFT LOWER QUADRANT ABDOMINAL PAIN: ICD-10-CM

## 2022-02-25 DIAGNOSIS — N28.1 CYST, KIDNEY, ACQUIRED: Primary | ICD-10-CM

## 2022-02-25 PROCEDURE — 74177 CT ABD & PELVIS W/CONTRAST: CPT | Performed by: FAMILY MEDICINE

## 2022-03-18 ENCOUNTER — TELEPHONE (OUTPATIENT)
Dept: FAMILY MEDICINE CLINIC | Age: 47
End: 2022-03-18

## 2022-03-18 DIAGNOSIS — F43.0 ACUTE STRESS REACTION: ICD-10-CM

## 2022-03-18 PROBLEM — I10 ESSENTIAL HYPERTENSION: Status: ACTIVE | Noted: 2019-06-14

## 2022-03-18 RX ORDER — TRAZODONE HYDROCHLORIDE 50 MG/1
50 TABLET ORAL
Qty: 30 TABLET | Refills: 0 | Status: SHIPPED | OUTPATIENT
Start: 2022-03-18 | End: 2022-03-18 | Stop reason: SDUPTHER

## 2022-03-18 RX ORDER — TRAZODONE HYDROCHLORIDE 50 MG/1
50 TABLET ORAL
Qty: 90 TABLET | Refills: 3 | Status: SHIPPED | OUTPATIENT
Start: 2022-03-18 | End: 2022-06-07 | Stop reason: SDUPTHER

## 2022-03-18 NOTE — TELEPHONE ENCOUNTER
----- Message from [address] [facility]. Martir Killian. sent at 3/16/2022  2:48 PM EDT -----  Regarding: Trazadone refill  Hi Dr. Jeff Locke,  I am almost out of the trazadone, I requested a refill in feb. But haven't gotten it, is there something I need to do to have it filled? Through [facility]. Thank you!

## 2022-06-07 ENCOUNTER — TELEPHONE (OUTPATIENT)
Dept: FAMILY MEDICINE CLINIC | Age: 47
End: 2022-06-07

## 2022-06-07 DIAGNOSIS — F43.0 ACUTE STRESS REACTION: ICD-10-CM

## 2022-06-07 RX ORDER — TRAZODONE HYDROCHLORIDE 50 MG/1
50 TABLET ORAL
Qty: 90 TABLET | Refills: 3 | Status: SHIPPED | OUTPATIENT
Start: 2022-06-07

## 2022-06-07 NOTE — TELEPHONE ENCOUNTER
----- Message from [address]. Fredy Art. sent at 6/7/2022 11:43 AM EDT -----  Regarding: Trazadone refill  Hi Dr. Sharath Babcock,  I am currently in need of a refill for the trazadone prescription. If possible could I get the 90 day supply through [facility] please? I usually only take half a pill at night, so the prescription lasts , so far I have been feeling great with no problems  while on it. Thank you!

## 2023-07-25 ENCOUNTER — OFFICE VISIT (OUTPATIENT)
Age: 48
End: 2023-07-25
Payer: COMMERCIAL

## 2023-07-25 VITALS
SYSTOLIC BLOOD PRESSURE: 136 MMHG | RESPIRATION RATE: 17 BRPM | HEART RATE: 105 BPM | BODY MASS INDEX: 24.73 KG/M2 | HEIGHT: 69 IN | WEIGHT: 167 LBS | TEMPERATURE: 98.2 F | DIASTOLIC BLOOD PRESSURE: 90 MMHG | OXYGEN SATURATION: 99 %

## 2023-07-25 DIAGNOSIS — L24.89 IRRITANT CONTACT DERMATITIS DUE TO OTHER AGENTS: ICD-10-CM

## 2023-07-25 DIAGNOSIS — I10 PRIMARY HYPERTENSION: ICD-10-CM

## 2023-07-25 DIAGNOSIS — K57.92 DIVERTICULITIS: Primary | ICD-10-CM

## 2023-07-25 DIAGNOSIS — R73.03 PREDIABETES: ICD-10-CM

## 2023-07-25 DIAGNOSIS — Z13.220 SCREENING FOR LIPID DISORDERS: ICD-10-CM

## 2023-07-25 PROCEDURE — 3075F SYST BP GE 130 - 139MM HG: CPT

## 2023-07-25 PROCEDURE — 99214 OFFICE O/P EST MOD 30 MIN: CPT

## 2023-07-25 PROCEDURE — 3080F DIAST BP >= 90 MM HG: CPT

## 2023-07-25 RX ORDER — METRONIDAZOLE 500 MG/1
500 TABLET ORAL 3 TIMES DAILY
Qty: 30 TABLET | Refills: 0 | Status: SHIPPED | OUTPATIENT
Start: 2023-07-25 | End: 2023-08-04

## 2023-07-25 RX ORDER — CIPROFLOXACIN 500 MG/1
500 TABLET, FILM COATED ORAL 2 TIMES DAILY
Qty: 20 TABLET | Refills: 0 | Status: SHIPPED | OUTPATIENT
Start: 2023-07-25 | End: 2023-08-04

## 2023-07-25 NOTE — PROGRESS NOTES
Chief Complaint   Patient presents with    Diverticulitis         Health Maintenance Due   Topic Date Due    COVID-19 Vaccine (1) Never done    A1C test (Diabetic or Prediabetic)  09/01/2021    Depression Screen  02/17/2023           1. \"Have you been to the ER, urgent care clinic since your last visit? Hospitalized since your last visit? \" No    2. \"Have you seen or consulted any other health care providers outside of the [address] [place] [address] since your last visit? \" No     3. For patients aged 43-73: Has the patient had a colonoscopy / FIT/ Cologuard? Yes - Care Gap present. Most recent result on file      If the patient is female:    4. For patients aged 43-66: Has the patient had a mammogram within the past 2 years? NA - based on age or sex      11. For patients aged 21-65: Has the patient had a pap smear?  NA - based on age or sex

## 2023-07-25 NOTE — PROGRESS NOTES
Indira Duncan. is a 50 y.o. male  and presents with   Chief Complaint   Patient presents with    Diverticulitis     He is having left lower quadrant pain that started yesterday. The pain is sharp and sitting the pain is a 4/10 but when he leans forward it was 7/10. He has a history of diverticulitis and notes this is exactly how it feels. He had a BM this morning which was loose and not pencil thin like in the past.   Last visit with Dr. Lakeshia Cuello it was recommend he may want to see surgery if he continues to have diverticulitis flares but patient refuses today. He denies any fever, N/V     The patient has essential hypertension   Chart shows that he was prescribed amlodipine but patient notes he has not been taking this medication in over 3 years. 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 He does not check his BP at home. Diet: Eating out about twice a week. Eats a lot of fried/fast food. Occasionally will cook at home. Drinks about 16-20oz of soda a day. Exercise: Work   Pertinent ROS: no TIA's, no chest pain on exertion, no dyspnea on exertion, no swelling of ankles. Currently on trazodone for sleep. This is well controlled with medication. Today he also notes a rash on left arm and neck that started about 1 week ago. He believe this could be poison ivy/oak as his dog was in the woods. The rash does itch. Not getting worse. Not painful. He denies any new lotions, soaps, cologne. He has not tried anything for the rash. Current Outpatient Medications on File Prior to Visit   Medication Sig Dispense Refill    ondansetron (ZOFRAN-ODT) 8 MG TBDP disintegrating tablet Take 1 tablet by mouth every 8 hours as needed      traZODone (DESYREL) 50 MG tablet Take 1 tablet by mouth       No current facility-administered medications on file prior to visit.       Past Medical History:   Diagnosis Date    Diverticulitis 01/04/2021     Past Surgical

## 2023-07-27 ENCOUNTER — TELEPHONE (OUTPATIENT)
Age: 48
End: 2023-07-27

## 2023-07-27 NOTE — TELEPHONE ENCOUNTER
Pt came in the office; pt states that the new medication he is on is making him very nausous; pt states that he did not get ay sleep last night due to that; pt is also requesting a muscle relaxer to help with pain that he is having; please call pt back as he is very concerned and drained       Thank You

## 2023-07-27 NOTE — TELEPHONE ENCOUNTER
Please advise pt that if he is having significant pain and nausea he should proceed to the ER. Needs imaging.

## 2023-11-15 RX ORDER — TRAZODONE HYDROCHLORIDE 50 MG/1
50 TABLET ORAL NIGHTLY
Qty: 90 TABLET | Refills: 3 | Status: SHIPPED | OUTPATIENT
Start: 2023-11-15

## 2023-11-15 NOTE — TELEPHONE ENCOUNTER
Refill request (pt spouse calling)    traZODone (DESYREL) 50 MG tablet    [facility]